# Patient Record
Sex: MALE | Race: WHITE | NOT HISPANIC OR LATINO | Employment: UNEMPLOYED | URBAN - METROPOLITAN AREA
[De-identification: names, ages, dates, MRNs, and addresses within clinical notes are randomized per-mention and may not be internally consistent; named-entity substitution may affect disease eponyms.]

---

## 2017-05-11 ENCOUNTER — TRANSFERRED RECORDS (OUTPATIENT)
Dept: HEALTH INFORMATION MANAGEMENT | Facility: CLINIC | Age: 57
End: 2017-05-11

## 2017-05-11 LAB — EJECTION FRACTION: 68 %

## 2019-01-24 ENCOUNTER — TRANSFERRED RECORDS (OUTPATIENT)
Dept: HEALTH INFORMATION MANAGEMENT | Facility: CLINIC | Age: 59
End: 2019-01-24

## 2019-01-29 ENCOUNTER — TRANSFERRED RECORDS (OUTPATIENT)
Dept: HEALTH INFORMATION MANAGEMENT | Facility: CLINIC | Age: 59
End: 2019-01-29

## 2019-02-01 ENCOUNTER — TRANSFERRED RECORDS (OUTPATIENT)
Dept: HEALTH INFORMATION MANAGEMENT | Facility: CLINIC | Age: 59
End: 2019-02-01

## 2023-02-17 ENCOUNTER — OFFICE VISIT (OUTPATIENT)
Dept: FAMILY MEDICINE | Facility: CLINIC | Age: 63
End: 2023-02-17
Payer: COMMERCIAL

## 2023-02-17 VITALS
HEIGHT: 60 IN | OXYGEN SATURATION: 98 % | HEART RATE: 73 BPM | BODY MASS INDEX: 26.5 KG/M2 | TEMPERATURE: 97.1 F | WEIGHT: 135 LBS | RESPIRATION RATE: 16 BRPM | SYSTOLIC BLOOD PRESSURE: 167 MMHG | DIASTOLIC BLOOD PRESSURE: 97 MMHG

## 2023-02-17 DIAGNOSIS — Z72.0 NICOTINE USE: ICD-10-CM

## 2023-02-17 DIAGNOSIS — K21.00 GASTROESOPHAGEAL REFLUX DISEASE WITH ESOPHAGITIS, UNSPECIFIED WHETHER HEMORRHAGE: ICD-10-CM

## 2023-02-17 DIAGNOSIS — Z23 NEED FOR COVID-19 VACCINE: ICD-10-CM

## 2023-02-17 DIAGNOSIS — E11.9 TYPE 2 DIABETES MELLITUS WITHOUT COMPLICATION, WITHOUT LONG-TERM CURRENT USE OF INSULIN (H): ICD-10-CM

## 2023-02-17 DIAGNOSIS — Z11.4 ENCOUNTER FOR SCREENING FOR HIV: ICD-10-CM

## 2023-02-17 DIAGNOSIS — E78.5 HYPERLIPIDEMIA, UNSPECIFIED HYPERLIPIDEMIA TYPE: ICD-10-CM

## 2023-02-17 DIAGNOSIS — R03.0 ELEVATED BP WITHOUT DIAGNOSIS OF HYPERTENSION: ICD-10-CM

## 2023-02-17 DIAGNOSIS — Z11.59 NEED FOR HEPATITIS C SCREENING TEST: ICD-10-CM

## 2023-02-17 DIAGNOSIS — Z76.89 ESTABLISHING CARE WITH NEW DOCTOR, ENCOUNTER FOR: Primary | ICD-10-CM

## 2023-02-17 LAB
ALBUMIN SERPL BCG-MCNC: 4.6 G/DL (ref 3.5–5.2)
ALP SERPL-CCNC: 101 U/L (ref 40–129)
ALT SERPL W P-5'-P-CCNC: 149 U/L (ref 10–50)
ANION GAP SERPL CALCULATED.3IONS-SCNC: 14 MMOL/L (ref 7–15)
AST SERPL W P-5'-P-CCNC: 102 U/L (ref 10–50)
BILIRUB SERPL-MCNC: 0.4 MG/DL
BUN SERPL-MCNC: 10.8 MG/DL (ref 8–23)
CALCIUM SERPL-MCNC: 9.5 MG/DL (ref 8.8–10.2)
CHLORIDE SERPL-SCNC: 104 MMOL/L (ref 98–107)
CHOLEST SERPL-MCNC: 317 MG/DL
CREAT SERPL-MCNC: 1.05 MG/DL (ref 0.67–1.17)
DEPRECATED HCO3 PLAS-SCNC: 25 MMOL/L (ref 22–29)
ERYTHROCYTE [DISTWIDTH] IN BLOOD BY AUTOMATED COUNT: 13.6 % (ref 10–15)
GFR SERPL CREATININE-BSD FRML MDRD: 80 ML/MIN/1.73M2
GLUCOSE SERPL-MCNC: 181 MG/DL (ref 70–99)
HBA1C MFR BLD: 7.3 % (ref 0–5.6)
HCT VFR BLD AUTO: 44.6 % (ref 40–53)
HDLC SERPL-MCNC: 29 MG/DL
HGB BLD-MCNC: 14.8 G/DL (ref 13.3–17.7)
LDLC SERPL CALC-MCNC: ABNORMAL MG/DL
MCH RBC QN AUTO: 26.8 PG (ref 26.5–33)
MCHC RBC AUTO-ENTMCNC: 33.2 G/DL (ref 31.5–36.5)
MCV RBC AUTO: 81 FL (ref 78–100)
NONHDLC SERPL-MCNC: 288 MG/DL
PLATELET # BLD AUTO: 191 10E3/UL (ref 150–450)
POTASSIUM SERPL-SCNC: 3.4 MMOL/L (ref 3.4–5.3)
PROT SERPL-MCNC: 8 G/DL (ref 6.4–8.3)
RBC # BLD AUTO: 5.52 10E6/UL (ref 4.4–5.9)
SODIUM SERPL-SCNC: 143 MMOL/L (ref 136–145)
TRIGL SERPL-MCNC: 499 MG/DL
WBC # BLD AUTO: 6.4 10E3/UL (ref 4–11)

## 2023-02-17 PROCEDURE — 0124A COVID-19 VACCINE BIVALENT BOOSTER 12+ (PFIZER): CPT | Performed by: STUDENT IN AN ORGANIZED HEALTH CARE EDUCATION/TRAINING PROGRAM

## 2023-02-17 PROCEDURE — 80061 LIPID PANEL: CPT | Performed by: STUDENT IN AN ORGANIZED HEALTH CARE EDUCATION/TRAINING PROGRAM

## 2023-02-17 PROCEDURE — 83036 HEMOGLOBIN GLYCOSYLATED A1C: CPT | Performed by: STUDENT IN AN ORGANIZED HEALTH CARE EDUCATION/TRAINING PROGRAM

## 2023-02-17 PROCEDURE — 86803 HEPATITIS C AB TEST: CPT | Performed by: STUDENT IN AN ORGANIZED HEALTH CARE EDUCATION/TRAINING PROGRAM

## 2023-02-17 PROCEDURE — 87389 HIV-1 AG W/HIV-1&-2 AB AG IA: CPT | Performed by: STUDENT IN AN ORGANIZED HEALTH CARE EDUCATION/TRAINING PROGRAM

## 2023-02-17 PROCEDURE — 36415 COLL VENOUS BLD VENIPUNCTURE: CPT | Performed by: STUDENT IN AN ORGANIZED HEALTH CARE EDUCATION/TRAINING PROGRAM

## 2023-02-17 PROCEDURE — 80053 COMPREHEN METABOLIC PANEL: CPT | Performed by: STUDENT IN AN ORGANIZED HEALTH CARE EDUCATION/TRAINING PROGRAM

## 2023-02-17 PROCEDURE — 85027 COMPLETE CBC AUTOMATED: CPT | Performed by: STUDENT IN AN ORGANIZED HEALTH CARE EDUCATION/TRAINING PROGRAM

## 2023-02-17 PROCEDURE — 99203 OFFICE O/P NEW LOW 30 MIN: CPT | Performed by: STUDENT IN AN ORGANIZED HEALTH CARE EDUCATION/TRAINING PROGRAM

## 2023-02-17 PROCEDURE — 91312 COVID-19 VACCINE BIVALENT BOOSTER 12+ (PFIZER): CPT | Performed by: STUDENT IN AN ORGANIZED HEALTH CARE EDUCATION/TRAINING PROGRAM

## 2023-02-17 RX ORDER — OMEPRAZOLE 40 MG/1
40 CAPSULE, DELAYED RELEASE ORAL DAILY
Qty: 90 CAPSULE | Refills: 3 | Status: SHIPPED | OUTPATIENT
Start: 2023-02-17

## 2023-02-17 NOTE — PROGRESS NOTES
Assessment & Plan     Establishing care with new doctor, encounter for  Patient recently moved here from [place].  Establishing care.    Hyperlipidemia, unspecified hyperlipidemia type  Chronic, not on any medications per patient.  We will check cholesterol levels today.  - Lipid Profile  - Lipid Profile    Type 2 diabetes mellitus without complication, without long-term current use of insulin (H)  Chronic, unclear if it is controlled or not.  Patient reports that he is on metformin 500 mg daily.  We will check A1c today.  Recommended that patient increase metformin to 500 mg twice daily.  Patient is agreeable.  - Comprehensive metabolic panel  - Hemoglobin A1c  - metFORMIN (GLUCOPHAGE) 500 MG tablet  Dispense: 180 tablet; Refill: 3  - Comprehensive metabolic panel  - Hemoglobin A1c    Gastroesophageal reflux disease with esophagitis, unspecified whether hemorrhage  Patient reports a history of gastric ulcers, reports that an EGD was done before.  Will refill omeprazole for the same dose 40 mg daily.  -We will have patient complete CHLOÉ for medical records from previous clinic.  Patient reports he does not member the name of the clinic, however will return the CHLOÉ form as soon as he is able.  - CBC with platelets  - omeprazole (PRILOSEC) 40 MG DR capsule  Dispense: 90 capsule; Refill: 3  - CBC with platelets    Need for hepatitis C screening test  - Hepatitis C Screen Reflex to HCV RNA Quant and Genotype  - Hepatitis C Screen Reflex to HCV RNA Quant and Genotype    Encounter for screening for HIV  - HIV Antigen Antibody Combo  - HIV Antigen Antibody Combo    Elevated BP without diagnosis of hypertension  Blood pressure elevated today, 167/97.  Asymptomatic.  Denies a history of hypertension.  Not on medications.  - RTC in 1 month for blood pressure recheck, if still elevated, will consider starting antihypertensive    Nicotine use  Smokes pipes 2-3x per day. Still smokes. Started at 15yo.     Need for COVID-19  "vaccine  - COVID-19 VACCINE BIVALENT BOOSTER 12+ (PFIZER)      Review of external notes as documented elsewhere in note       BMI:   Estimated body mass index is 28.34 kg/m  as calculated from the following:    Height as of this encounter: 1.47 m (4' 9.87\").    Weight as of this encounter: 61.2 kg (135 lb).   Weight management plan: Discussed healthy diet and exercise guidelines        Return in about 4 weeks (around 3/17/2023) for Follow up BP .    Sheila Pérez MD  [facility]    Chemo Rhodes is a 62 year old accompanied by his self, presenting for the following health issues:  office visit (Pt state he's been on metformin and omeprazole for two years now. He would like to check today if he still need to take it or not. Stop taking them since feb, 2022. Ulcers and cholesterol.)      History of Present Illness       Reason for visit:  Est care, new pt    He eats 2-3 servings of fruits and vegetables daily.He consumes 0 sweetened beverage(s) daily.He exercises with enough effort to increase his heart rate 9 or less minutes per day.  He exercises with enough effort to increase his heart rate 3 or less days per week.   He is taking medications regularly.       PMH: DM2 HLD, stomach disease or problem? Has not been seen for the last 2 years. Does not remember previous clinic in [place], will take CHLOÉ home and find clinic name. Reports that he was previously treated for latent TB.     PSH: No surgeries.     Social: recently moved her from [place]. Does not drink alcohol. Smokes pipes 2-3x per day. Still smokes. Started at 15yo.       DM2  - on metformin 500mg daily    Ulcers? GERD?  - prescribed omeprazole 40mg daily  - Reports that they did do an EGD. Was told that he day ulcers.   - Denies hematochezia or hematemesis. Stopped after taking this meds.   - Reports that he also had colonoscopy done - nothing was seen.     Review of Systems   Constitutional: Negative for fever and chills.   Respiratory: " "Negative for cough or shortness of breath.    Cardiovascular: Negative for chest pain or chest pressure.   Gastrointestinal: Negative for nausea, vomiting, diarrhea or abdominal pain.'      Objective    BP (!) 167/97 (BP Location: Left arm, Patient Position: Sitting, Cuff Size: Adult Regular)   Pulse 73   Temp 97.1  F (36.2  C) (Temporal)   Resp 16   Ht 1.47 m (4' 9.87\")   Wt 61.2 kg (135 lb)   SpO2 98%   BMI 28.34 kg/m    Body mass index is 28.34 kg/m .  Physical Exam   PHYSICAL EXAM  General: Well developed, well nourished.  Skin:  Dry without rash.    Head:  Normocephalic-atraumatic.    Eye:  Normal conjunctivae.     Respiratory:  Normal respiratory effort.   Gastrointestinal:  Non-distended.    Musculoskeletal:  No deformity or edema.  Neurologic: No focal deficits.              "

## 2023-02-20 PROBLEM — R74.8 ELEVATED LIVER ENZYMES: Status: ACTIVE | Noted: 2023-02-20

## 2023-02-20 PROBLEM — E78.2 MIXED HYPERLIPIDEMIA: Status: ACTIVE | Noted: 2023-02-17

## 2023-02-20 LAB
HCV AB SERPL QL IA: NONREACTIVE
HIV 1+2 AB+HIV1 P24 AG SERPL QL IA: NONREACTIVE

## 2023-02-21 ENCOUNTER — TELEPHONE (OUTPATIENT)
Dept: FAMILY MEDICINE | Facility: CLINIC | Age: 63
End: 2023-02-21
Payer: COMMERCIAL

## 2023-02-21 NOTE — LETTER
February 23, 2023      Carmelo Fuller  [address] [place] [address]        Dear Mr. Fuller,    We have tried to reach you by phone but your phone number is invalid. We are writing to inform you of your test results:    Screening for HIV and hepatitis C were negative.  Blood counts were normal.     Electrolytes and kidney function were normal.  Liver function test shows some elevated liver enzymes/liver inflammation.  We can recheck at your next visit.  I recommend that you fast for this.  If you would like me to order this lab for you to come in in another day fasting, and I can do that as well.     Cholesterol levels are very high.  I recommend that you start taking a daily medication for lowering your cholesterol and a baby aspirin daily to help lower yourrisk of having heart attack or stroke.  If you are okay with that, I can send prescriptions to your pharmacy.     Your diabetes is well controlled at this time.  We will continue the same medications as discussed at our recent visit.    Test results indicate you may require additional follow up. Please call us at [phone] to discuss cholesterol medication.     Resulted Orders   Lipid Profile   Result Value Ref Range    Cholesterol 317 (H) <200 mg/dL    Triglycerides 499 (H) <150 mg/dL    Direct Measure HDL 29 (L) >=40 mg/dL    LDL Cholesterol Calculated        Comment:      Cannot estimate LDL when triglyceride exceeds 400 mg/dL    Non HDL Cholesterol 288 (H) <130 mg/dL    Narrative    Cholesterol  Desirable:  <200 mg/dL    Triglycerides  Normal:  Less than 150 mg/dL  Borderline High:  150-199 mg/dL  High:  200-499 mg/dL  Very High:  Greater than or equal to 500 mg/dL    Direct Measure HDL  Female:  Greater than or equal to 50 mg/dL   Male:  Greater than or equal to 40 mg/dL    LDL Cholesterol  Desirable:  <100mg/dL  Above Desirable:  100-129 mg/dL   Borderline High:  130-159 mg/dL   High:  160-189 mg/dL   Very High:  >= 190 mg/dL    Non HDL  Cholesterol  Desirable:  130 mg/dL  Above Desirable:  130-159 mg/dL  Borderline High:  160-189 mg/dL  High:  190-219 mg/dL  Very High:  Greater than or equal to 220 mg/dL   Comprehensive metabolic panel   Result Value Ref Range    Sodium 143 136 - 145 mmol/L    Potassium 3.4 3.4 - 5.3 mmol/L    Chloride 104 98 - 107 mmol/L    Carbon Dioxide (CO2) 25 22 - 29 mmol/L    Anion Gap 14 7 - 15 mmol/L    Urea Nitrogen 10.8 8.0 - 23.0 mg/dL    Creatinine 1.05 0.67 - 1.17 mg/dL    Calcium 9.5 8.8 - 10.2 mg/dL    Glucose 181 (H) 70 - 99 mg/dL    Alkaline Phosphatase 101 40 - 129 U/L     (H) 10 - 50 U/L     (H) 10 - 50 U/L    Protein Total 8.0 6.4 - 8.3 g/dL    Albumin 4.6 3.5 - 5.2 g/dL    Bilirubin Total 0.4 <=1.2 mg/dL    GFR Estimate 80 >60 mL/min/1.73m2      Comment:      eGFR calculated using 2021 CKD-EPI equation.   Hemoglobin A1c   Result Value Ref Range    Hemoglobin A1C 7.3 (H) 0.0 - 5.6 %      Comment:      Normal <5.7%   Prediabetes 5.7-6.4%    Diabetes 6.5% or higher     Note: Adopted from ADA consensus guidelines.   CBC with platelets   Result Value Ref Range    WBC Count 6.4 4.0 - 11.0 10e3/uL    RBC Count 5.52 4.40 - 5.90 10e6/uL    Hemoglobin 14.8 13.3 - 17.7 g/dL    Hematocrit 44.6 40.0 - 53.0 %    MCV 81 78 - 100 fL    MCH 26.8 26.5 - 33.0 pg    MCHC 33.2 31.5 - 36.5 g/dL    RDW 13.6 10.0 - 15.0 %    Platelet Count 191 150 - 450 10e3/uL   HIV Antigen Antibody Combo   Result Value Ref Range    HIV Antigen Antibody Combo Nonreactive Nonreactive      Comment:      HIV-1 p24 Ag & HIV-1/HIV-2 Ab Not Detected   Hepatitis C Screen Reflex to HCV RNA Quant and Genotype   Result Value Ref Range    Hepatitis C Antibody Nonreactive Nonreactive    Narrative    Assay performance characteristics have not been established for newborns, infants, and children.       If you have any questions or concerns, please call the clinic at the number listed above.       Sincerely,      Sheila Pérez MD / [facility] RN

## 2023-02-21 NOTE — TELEPHONE ENCOUNTER
"Attempted to call pt and relay lab result. The phone number stated \"this number you are calling has been changed or disconnected\" no other phone number on file and no C2C on file.    Franki Coto Cem Say, ALIREZA MADRID  [facility]        ----- Message from Sheila Pérez MD sent at 2/20/2023  1:58 PM CST -----  Screening for HIV and hepatitis C were negative.  Blood counts were normal.    Electrolytes and kidney function were normal.  Liver function test shows some elevated liver enzymes/liver inflammation.  We can recheck at his next visit.  I recommend that he fast for this.  If he would like me to order this lab for him to come in in another day fasting, and I can do that as well.    Cholesterol levels are very high.  I recommend that he start taking a daily medication for lowering his cholesterol and a baby aspirin daily to help lower his risk of having heart attack or stroke.  If he is okay with that, I can send prescriptions to his pharmacy.    His diabetes is well controlled at this time.  We will continue the same medications as discussed at his visit.    "

## 2023-02-22 NOTE — TELEPHONE ENCOUNTER
RN made 2nd attempt to call patient via [facility]  to relay the lab results below.      Patient's phone number did not work. Attempted to call patient's friend ( no c2c on file) and left non-detailed message to call the clinic back.       If patient calls back, please relay Dr. Pérez's message below.        Yue Medina RN  [facility]

## 2023-02-23 NOTE — TELEPHONE ENCOUNTER
RN made 3rd attempt to call patient regarding lab results below. Patient's number invalid and friend did not answer.     Per protocol, RN will send letter to home address on file.     Lynnette Tracy RN BSN  [facility]

## 2023-03-20 ENCOUNTER — OFFICE VISIT (OUTPATIENT)
Dept: FAMILY MEDICINE | Facility: CLINIC | Age: 63
End: 2023-03-20
Payer: COMMERCIAL

## 2023-03-20 VITALS
DIASTOLIC BLOOD PRESSURE: 96 MMHG | TEMPERATURE: 96.9 F | HEART RATE: 64 BPM | OXYGEN SATURATION: 96 % | WEIGHT: 127.5 LBS | BODY MASS INDEX: 25.03 KG/M2 | SYSTOLIC BLOOD PRESSURE: 157 MMHG | RESPIRATION RATE: 16 BRPM | HEIGHT: 60 IN

## 2023-03-20 DIAGNOSIS — E78.2 MIXED HYPERLIPIDEMIA: ICD-10-CM

## 2023-03-20 DIAGNOSIS — I10 PRIMARY HYPERTENSION: Primary | ICD-10-CM

## 2023-03-20 DIAGNOSIS — R21 RASH: ICD-10-CM

## 2023-03-20 PROCEDURE — 99214 OFFICE O/P EST MOD 30 MIN: CPT | Performed by: STUDENT IN AN ORGANIZED HEALTH CARE EDUCATION/TRAINING PROGRAM

## 2023-03-20 RX ORDER — KETOCONAZOLE 20 MG/G
CREAM TOPICAL DAILY
Qty: 15 G | Refills: 1 | Status: SHIPPED | OUTPATIENT
Start: 2023-03-20 | End: 2023-07-03

## 2023-03-20 RX ORDER — LISINOPRIL 10 MG/1
10 TABLET ORAL DAILY
Qty: 90 TABLET | Refills: 0 | Status: SHIPPED | OUTPATIENT
Start: 2023-03-20 | End: 2023-07-03

## 2023-03-20 NOTE — PROGRESS NOTES
Hyperlipidemia, unspecified hyperlipidemia type  Chronic, not on any medications per patient.  We will check cholesterol levels today.  - Lipid Profile  - Lipid Profile     Type 2 diabetes mellitus without complication, without long-term current use of insulin (H)  Chronic, unclear if it is controlled or not.  Patient reports that he is on metformin 500 mg daily.  We will check A1c today.  Recommended that patient increase metformin to 500 mg twice daily.  Patient is agreeable.  - Comprehensive metabolic panel  - Hemoglobin A1c  - metFORMIN (GLUCOPHAGE) 500 MG tablet  Dispense: 180 tablet; Refill: 3  - Comprehensive metabolic panel  - Hemoglobin A1c     Gastroesophageal reflux disease with esophagitis, unspecified whether hemorrhage  Patient reports a history of gastric ulcers, reports that an EGD was done before.  Will refill omeprazole for the same dose 40 mg daily.  -We will have patient complete CHLOÉ for medical records from previous clinic.  Patient reports he does not member the name of the clinic, however will return the CHLOÉ form as soon as he is able.  - CBC with platelets  - omeprazole (PRILOSEC) 40 MG DR capsule  Dispense: 90 capsule; Refill: 3  - CBC with platelets     Need for hepatitis C screening test  - Hepatitis C Screen Reflex to HCV RNA Quant and Genotype  - Hepatitis C Screen Reflex to HCV RNA Quant and Genotype     Encounter for screening for HIV  - HIV Antigen Antibody Combo  - HIV Antigen Antibody Combo     Elevated BP without diagnosis of hypertension  Blood pressure elevated today, 167/97.  Asymptomatic.  Denies a history of hypertension.  Not on medications.  - RTC in 1 month for blood pressure recheck, if still elevated, will consider starting antihypertensive     Nicotine use  Smokes pipes 2-3x per day. Still smokes. Started at 13yo.     Lab Results   Component Value Date    HGB 14.8 02/17/2023    WBC 6.4 02/17/2023    MCV 81 02/17/2023    CR 1.05 02/17/2023     (H) 02/17/2023    ALT  149 (H) 02/17/2023    GFRESTIMATED 80 02/17/2023    HCVAB Nonreactive 02/17/2023    HIAGAB Nonreactive 02/17/2023        Liver function test shows some elevated liver enzymes/liver inflammation.  We can recheck at his next visit.  I recommend that he fast for this.  If he would like me to order this lab for him to come in in another day fasting, and I can do that as well.     Cholesterol levels are very high.  I recommend that he start taking a daily medication for lowering his cholesterol and a baby aspirin daily to help lower his risk of having heart attack or stroke.  If he is okay with that, I can send prescriptions to his pharmacy.     His diabetes is well controlled at this time.  We will continue the same medications as discussed at his visit.

## 2023-03-20 NOTE — PROGRESS NOTES
Assessment & Plan     Primary hypertension  Blood pressure high again today.  We will start patient on lisinopril 10 mg daily.  - lisinopril (ZESTRIL) 10 MG tablet  Dispense: 90 tablet; Refill: 0  -RTC in 1 month for blood pressure recheck    Mixed hyperlipidemia  Discussed starting aspirin for primary prevention today.  Patient is agreeable.  We will start baby aspirin.  - aspirin (ASA) 81 MG EC tablet  Dispense: 90 tablet; Refill: 3  -Discuss statin at next visit    Rash  Patient reports chronic generalized pruritic rash all over the body including bilateral upper and lower extremities and trunk.  Patient reports that he has used ketoconazole with mild improvement, requested refill today. no obvious rash on exam today.  - ketoconazole (NIZORAL) 2 % external cream  Dispense: 15 g; Refill: 1  -If not improved, consider steroid cream        Return in about 4 weeks (around 4/17/2023) for Routine preventive, BP.    Sheila Pérez MD  [facility]    Chemo Rhodes is a 62 year old accompanied by his self, presenting for the following health issues:  office visit (Follow up on bp, he would come back for physical check. Needed baby aspirin and cream for itchy skin.)      History of Present Illness       Hypertension: He presents for follow up of hypertension.  He does not check blood pressure  regularly outside of the clinic. Outpatient blood pressures have not been over 140/90. He follows a low salt diet.     He eats 2-3 servings of fruits and vegetables daily.He consumes 0 sweetened beverage(s) daily.He exercises with enough effort to increase his heart rate 9 or less minutes per day.  He exercises with enough effort to increase his heart rate 3 or less days per week.   He is taking medications regularly.      Reports generalized rash. Chronic. All over body. No specific visible rash, but feels itchy. Has been using ketoconazole cream with mild relief. Would like a refill.         Review of  "Systems   Constitutional: Negative for fever and chills.   Respiratory: Negative for cough or shortness of breath.    Cardiovascular: Negative for chest pain or chest pressure.   Gastrointestinal: Negative for nausea, vomiting, diarrhea or abdominal pain.        Objective    BP (!) 157/96 (BP Location: Left arm, Patient Position: Sitting, Cuff Size: Adult Regular)   Pulse 64   Temp 96.9  F (36.1  C) (Temporal)   Resp 16   Ht 1.47 m (4' 9.87\")   Wt 57.8 kg (127 lb 8 oz)   SpO2 96%   BMI 26.76 kg/m    Body mass index is 26.76 kg/m .  Physical Exam   PHYSICAL EXAM  General: Well developed, well nourished.  Skin:  Dry without rash.    Head:  Normocephalic-atraumatic.    Eye:  Normal conjunctivae.     Respiratory:  Normal respiratory effort.   Gastrointestinal:  Non-distended.    Musculoskeletal:  No deformity or edema.  Neurologic: No focal deficits.          "

## 2023-05-16 ENCOUNTER — TELEPHONE (OUTPATIENT)
Dept: FAMILY MEDICINE | Facility: CLINIC | Age: 63
End: 2023-05-16

## 2023-05-16 NOTE — TELEPHONE ENCOUNTER
Staff message    ----- Message from Sheila Pérez MD sent at 5/16/2023  2:44 PM CDT -----  Regarding: FW: Colonoscopy  Please call patient to inform him that he is due for blood pressure recheck, as well as a colonoscopy and a preventative visit.  Please offer an appointment for preventative visit to address all of the above.    ----- Message -----  From: Bhumika Elmore RN  Sent: 4/26/2023   2:52 PM CDT  To: Sheila Pérez MD  Subject: Colonoscopy                                      Hi Dr. Pérez,  I am part of the colorectal cancer screening team. We review all colonoscopies and then update or ensure health maintenance is accurate and place a standing order if they meet our high risk criteria. This patient had a tubulovillous polyp back in 2019 per the path report we received, per current guidelines he would be due for repeat screening in 3 years. Hence he is now overdue. Since he is a new patient to the system and was just seen by you I did not feel I should place the order. Please let me know if you would like your team to reach out to him to discuss this.  Thank you,  Nicky Elmore, RN BSN  RN Colorectal Cancer   Division of Gastroenterology  [facility]

## 2023-05-18 NOTE — TELEPHONE ENCOUNTER
Unable to lvm due to ph# on file is not an active ph#. Letter will be sent out to pt and will no longer try to reach pt. Completing task.

## 2023-07-03 ENCOUNTER — OFFICE VISIT (OUTPATIENT)
Dept: FAMILY MEDICINE | Facility: CLINIC | Age: 63
End: 2023-07-03
Payer: COMMERCIAL

## 2023-07-03 VITALS
BODY MASS INDEX: 26.5 KG/M2 | DIASTOLIC BLOOD PRESSURE: 90 MMHG | HEART RATE: 63 BPM | SYSTOLIC BLOOD PRESSURE: 160 MMHG | OXYGEN SATURATION: 98 % | RESPIRATION RATE: 20 BRPM | HEIGHT: 60 IN | TEMPERATURE: 96.9 F | WEIGHT: 135 LBS

## 2023-07-03 DIAGNOSIS — Z72.0 NICOTINE USE: ICD-10-CM

## 2023-07-03 DIAGNOSIS — Z00.00 ROUTINE GENERAL MEDICAL EXAMINATION AT A HEALTH CARE FACILITY: Primary | ICD-10-CM

## 2023-07-03 DIAGNOSIS — D12.6 TUBULOVILLOUS ADENOMA POLYP OF COLON: ICD-10-CM

## 2023-07-03 DIAGNOSIS — Z23 NEED FOR SHINGLES VACCINE: ICD-10-CM

## 2023-07-03 DIAGNOSIS — Z23 NEED FOR PROPHYLACTIC VACCINATION AGAINST STREPTOCOCCUS PNEUMONIAE (PNEUMOCOCCUS): ICD-10-CM

## 2023-07-03 DIAGNOSIS — E11.9 TYPE 2 DIABETES MELLITUS WITHOUT COMPLICATION, WITHOUT LONG-TERM CURRENT USE OF INSULIN (H): ICD-10-CM

## 2023-07-03 DIAGNOSIS — I10 PRIMARY HYPERTENSION: ICD-10-CM

## 2023-07-03 DIAGNOSIS — R80.9 URINE TEST POSITIVE FOR MICROALBUMINURIA: ICD-10-CM

## 2023-07-03 DIAGNOSIS — E78.2 MIXED HYPERLIPIDEMIA: ICD-10-CM

## 2023-07-03 DIAGNOSIS — R74.8 ELEVATED LIVER ENZYMES: ICD-10-CM

## 2023-07-03 PROBLEM — R03.0 ELEVATED BP WITHOUT DIAGNOSIS OF HYPERTENSION: Status: RESOLVED | Noted: 2023-02-17 | Resolved: 2023-07-03

## 2023-07-03 LAB
CREAT UR-MCNC: 91.1 MG/DL
MICROALBUMIN UR-MCNC: 31.8 MG/L
MICROALBUMIN/CREAT UR: 34.91 MG/G CR (ref 0–17)

## 2023-07-03 PROCEDURE — 82043 UR ALBUMIN QUANTITATIVE: CPT | Performed by: STUDENT IN AN ORGANIZED HEALTH CARE EDUCATION/TRAINING PROGRAM

## 2023-07-03 PROCEDURE — 90471 IMMUNIZATION ADMIN: CPT | Performed by: STUDENT IN AN ORGANIZED HEALTH CARE EDUCATION/TRAINING PROGRAM

## 2023-07-03 PROCEDURE — 99214 OFFICE O/P EST MOD 30 MIN: CPT | Mod: 25 | Performed by: STUDENT IN AN ORGANIZED HEALTH CARE EDUCATION/TRAINING PROGRAM

## 2023-07-03 PROCEDURE — 90677 PCV20 VACCINE IM: CPT | Performed by: STUDENT IN AN ORGANIZED HEALTH CARE EDUCATION/TRAINING PROGRAM

## 2023-07-03 PROCEDURE — 99396 PREV VISIT EST AGE 40-64: CPT | Mod: 25 | Performed by: STUDENT IN AN ORGANIZED HEALTH CARE EDUCATION/TRAINING PROGRAM

## 2023-07-03 PROCEDURE — 82570 ASSAY OF URINE CREATININE: CPT | Performed by: STUDENT IN AN ORGANIZED HEALTH CARE EDUCATION/TRAINING PROGRAM

## 2023-07-03 RX ORDER — LISINOPRIL 10 MG/1
10 TABLET ORAL DAILY
Qty: 90 TABLET | Refills: 3 | Status: SHIPPED | OUTPATIENT
Start: 2023-07-03 | End: 2024-05-13

## 2023-07-03 RX ORDER — ATORVASTATIN CALCIUM 40 MG/1
40 TABLET, FILM COATED ORAL DAILY
Qty: 90 TABLET | Refills: 3 | Status: SHIPPED | OUTPATIENT
Start: 2023-07-03 | End: 2024-05-13

## 2023-07-03 ASSESSMENT — ENCOUNTER SYMPTOMS
EYE PAIN: 0
ABDOMINAL PAIN: 1
NERVOUS/ANXIOUS: 0
FREQUENCY: 1
DIZZINESS: 0
HEMATURIA: 0
HEMATOCHEZIA: 1
SHORTNESS OF BREATH: 0
WEAKNESS: 1
DYSURIA: 0
PALPITATIONS: 0
CHILLS: 0
PARESTHESIAS: 0
HEADACHES: 0
FEVER: 0
SORE THROAT: 0
DIARRHEA: 0
JOINT SWELLING: 0
NAUSEA: 0
ARTHRALGIAS: 1
HEARTBURN: 0
COUGH: 1
CONSTIPATION: 1
MYALGIAS: 0

## 2023-07-03 NOTE — PROGRESS NOTES
Lab Results   Component Value Date    HGB 14.8 02/17/2023    WBC 6.4 02/17/2023    MCV 81 02/17/2023    CR 1.05 02/17/2023     (H) 02/17/2023     (H) 02/17/2023    GFRESTIMATED 80 02/17/2023    HCVAB Nonreactive 02/17/2023    HIAGAB Nonreactive 02/17/2023     Labs reviewed:  Electrolytes and kidney function were normal.  Liver function test shows some elevated liver enzymes/liver inflammation.  We can recheck at his next visit.  I recommend that he fast for this.  If he would like me to order this lab for him to come in in another day fasting, and I can do that as well.  Cholesterol levels are very high.  I recommend that he start taking a daily medication for lowering his cholesterol and a baby aspirin daily to help lower his risk of having heart attack or stroke.  If he is okay with that, I can send prescriptions to his pharmacy.  His diabetes is well controlled at this time.  We will continue the same medications as discussed at his visit.      HTN  - BP higher today, reports that he forgot to take it for 2 weeks.   - Denies SOB, CP, vision changes, HA, or palpitations.    Phone #: [phone]

## 2023-07-03 NOTE — PROGRESS NOTES
SUBJECTIVE:   CC: Carmelo is an 63 year old who presents for preventative health visit.       7/3/2023    10:19 AM   Additional Questions   Roomed by gilles     Healthy Habits:     Getting at least 3 servings of Calcium per day:  Yes    Bi-annual eye exam:  NO    Dental care twice a year:  NO    Sleep apnea or symptoms of sleep apnea:  Daytime drowsiness    Diet:  Regular (no restrictions)    Frequency of exercise:  None    Taking medications regularly:  No    Barriers to taking medications:  Problems remembering to take them    Medication side effects:  None    Additional concerns today:  No      Today's PHQ-2 Score:       7/3/2023    10:23 AM   PHQ-2 ( 1999 [employer])   Q1: Little interest or pleasure in doing things 0   Q2: Feeling down, depressed or hopeless 0   PHQ-2 Score 0         Lab Results   Component Value Date    HGB 14.8 02/17/2023    WBC 6.4 02/17/2023    MCV 81 02/17/2023    CR 1.05 02/17/2023     (H) 02/17/2023     (H) 02/17/2023    GFRESTIMATED 80 02/17/2023    HCVAB Nonreactive 02/17/2023    HIAGAB Nonreactive 02/17/2023     Labs reviewed:  Electrolytes and kidney function were normal.  Liver function test shows some elevated liver enzymes/liver inflammation.  We can recheck at his next visit.  I recommend that he fast for this.  If he would like me to order this lab for him to come in in another day fasting, and I can do that as well.  Cholesterol levels are very high.  I recommend that he start taking a daily medication for lowering his cholesterol and a baby aspirin daily to help lower his risk of having heart attack or stroke.  If he is okay with that, I can send prescriptions to his pharmacy.  His diabetes is well controlled at this time.  We will continue the same medications as discussed at his visit.      HTN  - BP higher today, reports that he forgot to take it for 2 weeks.   - Denies SOB, CP, vision changes, HA, or palpitations.    Phone #: [phone]          Social History  "    Tobacco Use     Smoking status: Never     Smokeless tobacco: Never   Substance Use Topics     Alcohol use: Not on file             7/3/2023    10:19 AM   Alcohol Use   Prescreen: >3 drinks/day or >7 drinks/week? No       Last PSA: No results found for: PSA    Reviewed orders with patient. Reviewed health maintenance and updated orders accordingly - Yes  Lab work is in process  Labs reviewed in EPIC    Reviewed and updated as needed this visit by clinical staff    Allergies  Meds              Reviewed and updated as needed this visit by Provider                     Review of Systems   Constitutional: Negative for chills and fever.   HENT: Positive for congestion and hearing loss. Negative for ear pain and sore throat.    Eyes: Negative for pain and visual disturbance.   Respiratory: Positive for cough. Negative for shortness of breath.    Cardiovascular: Negative for chest pain, palpitations and peripheral edema.   Gastrointestinal: Positive for abdominal pain, constipation and hematochezia. Negative for diarrhea, heartburn and nausea.   Genitourinary: Positive for frequency and urgency. Negative for dysuria, genital sores, hematuria, impotence and penile discharge.   Musculoskeletal: Positive for arthralgias. Negative for joint swelling and myalgias.   Skin: Negative for rash.   Neurological: Positive for weakness. Negative for dizziness, headaches and paresthesias.   Psychiatric/Behavioral: Negative for mood changes. The patient is not nervous/anxious.          OBJECTIVE:   BP (!) 160/90   Pulse 63   Temp 96.9  F (36.1  C) (Temporal)   Resp 20   Ht 1.465 m (4' 9.68\")   Wt 61.2 kg (135 lb)   SpO2 98%   BMI 28.53 kg/m      Physical Exam  General Appearance:  Alert, cooperative, no distress, appears stated age.  Head:  Normocephalic, without obvious abnormality, atraumatic.  Eyes:  Conjunctivae/corneas clear, extraocular movements intact both eyes.  Lungs:  Clear to auscultation bilaterally, respirations " unlabored.  Heart:  Regular rate and rhythm, S1 and S2 normal, no murmur, rub or gallop.  Abdomen:  Soft, non-tender, bowel sounds active all four quadrants.   Extremities:  Atraumatic, no cyanosis or edema.  Skin:  Skin color, texture, turgor normal, no rashes or lesions.  Neurologic: No focal deficits.      ASSESSMENT/PLAN:   Carmelo was seen today for hypertension and physical.    Diagnoses and all orders for this visit:    Routine general medical examination at a health care facility  -     REVIEW OF HEALTH MAINTENANCE PROTOCOL ORDERS    Primary hypertension  Chronic, uncontrolled.  Dramatic.  Patient reports that he has not been compliant with his lisinopril for the past 2 weeks due to forgetting.  Discussed importance of medication compliance.  Patient was agreeable.  -     lisinopril (ZESTRIL) 10 MG tablet; Take 1 tablet (10 mg) by mouth daily  -RTC in 1 month for blood pressure recheck.    Type 2 diabetes mellitus without complication, without long-term current use of insulin (H)  Recently diagnosed at last visit.  Patient has been taking metformin 500 mg twice daily, discussed slow titration up to 1 g twice daily, patient was agreeable.  Recheck ordered, however patient will return to clinic to complete when he does his hepatic panel as well.  -     Hemoglobin A1c; Future  -     Albumin Random Urine Quantitative with Creat Ratio; Future  -     Adult Eye  Referral; Future  -     metFORMIN (GLUCOPHAGE) 500 MG tablet; Take 2 tablets (1,000 mg) by mouth 2 times daily (with meals) Start with 1 tab 2 times a day for 1 week, increase to 2 tabs in the morning and 1 tab at night for 1 week, then increase to 2 tabs 2 times daily.  -     Albumin Random Urine Quantitative with Creat Ratio    Need for prophylactic vaccination against Streptococcus pneumoniae (pneumococcus)  -     PNEUMOCOCCAL 20 VALENT CONJUGATE (PREVNAR 20)    Need for shingles vaccine  Defer    Tubulovillous adenoma polyp of colon  Last  colonoscopy 2019, due for colonoscopy.  Patient was agreeable.  Ordered.  -     Colonoscopy Screening  Referral; Future    Elevated liver enzymes  Recommended the patient come in fasting today for repeat lab, however patient was not fasting.  Patient will make a lab appointment to come in that third day to complete lab fasting.  -     Hepatic panel (Albumin, ALT, AST, Bili, Alk Phos, TP); Future    Mixed hyperlipidemia  The 10-year ASCVD risk score (Leah ORLANDO, et al., 2019) is: 57.6%  Discussed with patient that he should start cholesterol medication.  He was agreeable.  Ordered.  -     atorvastatin (LIPITOR) 40 MG tablet; Take 1 tablet (40 mg) by mouth daily        Patient has been advised of split billing requirements and indicates understanding: Yes      COUNSELING:   Reviewed preventive health counseling, as reflected in patient instructions       Regular exercise       Healthy diet/nutrition       Vision screening       Hearing screening       Advance Care Planning        He reports that he has never smoked. He has never used smokeless tobacco.            Sheila Pérez MD  [facility]

## 2023-07-06 ENCOUNTER — TELEPHONE (OUTPATIENT)
Dept: FAMILY MEDICINE | Facility: CLINIC | Age: 63
End: 2023-07-06
Payer: COMMERCIAL

## 2023-07-06 PROBLEM — R80.9 URINE TEST POSITIVE FOR MICROALBUMINURIA: Status: ACTIVE | Noted: 2023-07-06

## 2023-07-06 NOTE — TELEPHONE ENCOUNTER
Attempted to contact patient, unable to leave message as voice mail box not set up. If he calls back, please relay message below from Dr. Pérez and assist with scheduling lab appointment.       ----- Message from Sheila Pérez MD sent at 7/6/2023  2:07 PM CDT -----  Urine test showed that he did have a small amount of protein in his urine.  We will need to recheck this to confirm.  I have already ordered a lab.  Please help him make a lab appointment to complete this.

## 2023-07-07 NOTE — PROGRESS NOTES
Attempt #1. Unable to lvm. Postponing until tomorrow. If patient calls back please help patient schedule appt listed below. Thanks!

## 2023-07-07 NOTE — TELEPHONE ENCOUNTER
RN made 2nd attempt to contact patient, unable to leave message as voice mail box not set up. If he calls back, please relay message below from Dr. Pérez and assist with scheduling lab appointment.     Samina Vasquez RN  [facility]    ----- Message from Sheila Pérez MD sent at 7/6/2023  2:07 PM CDT -----  Urine test showed that he did have a small amount of protein in his urine.  We will need to recheck this to confirm.  I have already ordered a lab.  Please help him make a lab appointment to complete this.

## 2023-07-10 NOTE — PROGRESS NOTES
Unable to lvm as vm box is not setup. Postponing until tomorrow. If patient calls back please help patient schedule appts listed below. Thanks! 2nd attempt.

## 2023-07-10 NOTE — TELEPHONE ENCOUNTER
Contacted patient and discussed urine results. He verbalized understanding. He has a lab appointment already scheduled this week for lipid panel, will complete urine at that appointment as well. Date & time confirmed.

## 2023-07-14 ENCOUNTER — LAB (OUTPATIENT)
Dept: LAB | Facility: CLINIC | Age: 63
End: 2023-07-14
Payer: COMMERCIAL

## 2023-07-14 DIAGNOSIS — R74.8 ELEVATED LIVER ENZYMES: ICD-10-CM

## 2023-07-14 DIAGNOSIS — E11.9 TYPE 2 DIABETES MELLITUS WITHOUT COMPLICATION, WITHOUT LONG-TERM CURRENT USE OF INSULIN (H): ICD-10-CM

## 2023-07-14 DIAGNOSIS — R80.9 URINE TEST POSITIVE FOR MICROALBUMINURIA: ICD-10-CM

## 2023-07-14 LAB
ALBUMIN SERPL BCG-MCNC: 4.4 G/DL (ref 3.5–5.2)
ALP SERPL-CCNC: 102 U/L (ref 40–129)
ALT SERPL W P-5'-P-CCNC: 113 U/L (ref 0–70)
AST SERPL W P-5'-P-CCNC: 67 U/L (ref 0–45)
BILIRUB DIRECT SERPL-MCNC: <0.2 MG/DL (ref 0–0.3)
BILIRUB SERPL-MCNC: 0.3 MG/DL
CREAT UR-MCNC: 235 MG/DL
HBA1C MFR BLD: 8.3 % (ref 0–5.6)
MICROALBUMIN UR-MCNC: 25.2 MG/L
MICROALBUMIN/CREAT UR: 10.72 MG/G CR (ref 0–17)
PROT SERPL-MCNC: 7.9 G/DL (ref 6.4–8.3)

## 2023-07-14 PROCEDURE — 82570 ASSAY OF URINE CREATININE: CPT

## 2023-07-14 PROCEDURE — 82728 ASSAY OF FERRITIN: CPT

## 2023-07-14 PROCEDURE — 80076 HEPATIC FUNCTION PANEL: CPT

## 2023-07-14 PROCEDURE — 36415 COLL VENOUS BLD VENIPUNCTURE: CPT

## 2023-07-14 PROCEDURE — 83036 HEMOGLOBIN GLYCOSYLATED A1C: CPT

## 2023-07-14 PROCEDURE — 82043 UR ALBUMIN QUANTITATIVE: CPT

## 2023-07-18 ENCOUNTER — TELEPHONE (OUTPATIENT)
Dept: FAMILY MEDICINE | Facility: CLINIC | Age: 63
End: 2023-07-18
Payer: COMMERCIAL

## 2023-07-18 PROBLEM — R93.2 ABNORMAL LIVER ULTRASOUND: Status: ACTIVE | Noted: 2023-07-18

## 2023-07-18 LAB — FERRITIN SERPL-MCNC: 191 NG/ML (ref 31–409)

## 2023-07-18 NOTE — LETTER
July 20, 2023      Carmelo Fuller  [address]   [place] [address]        Dear ,    We are writing to inform you of your test results.    Your liver enzymes are still elevated.  If you are okay with it I am going to test for hepatitis B and abnormal iron storage.  We should be able to use the blood that we good the other day.     Diabetes control has worsened.  A1c increased from 7.3 to 8.3.  I recommend Lifestyle modifications, including: Increasing intake of vegetables and fruits, decreasing intake of processed foods/carbohydrates/sugars, having regular physical activity.     Screening for kidney damage was normal.    Resulted Orders   Hemoglobin A1c   Result Value Ref Range    Hemoglobin A1C 8.3 (H) 0.0 - 5.6 %      Comment:      Normal <5.7%   Prediabetes 5.7-6.4%    Diabetes 6.5% or higher     Note: Adopted from ADA consensus guidelines.   Hepatic panel (Albumin, ALT, AST, Bili, Alk Phos, TP)   Result Value Ref Range    Protein Total 7.9 6.4 - 8.3 g/dL    Albumin 4.4 3.5 - 5.2 g/dL    Bilirubin Total 0.3 <=1.2 mg/dL    Alkaline Phosphatase 102 40 - 129 U/L    AST 67 (H) 0 - 45 U/L      Comment:      Reference intervals for this test were updated on 6/12/2023 to more accurately reflect our healthy population. There may be differences in the flagging of prior results with similar values performed with this method. Interpretation of those prior results can be made in the context of the updated reference intervals.     (H) 0 - 70 U/L      Comment:      Reference intervals for this test were updated on 6/12/2023 to more accurately reflect our healthy population. There may be differences in the flagging of prior results with similar values performed with this method. Interpretation of those prior results can be made in the context of the updated reference intervals.      Bilirubin Direct <0.20 0.00 - 0.30 mg/dL   Albumin Random Urine Quantitative with Creat Ratio   Result Value Ref Range     Creatinine Urine mg/dL 235.0 mg/dL      Comment:      The reference ranges have not been established in urine creatinine. The results should be integrated into the clinical context for interpretation.    Albumin Urine mg/L 25.2 mg/L      Comment:      The reference ranges have not been established in urine albumin. The results should be integrated into the clinical context for interpretation.    Albumin Urine mg/g Cr 10.72 0.00 - 17.00 mg/g Cr      Comment:      Microalbuminuria is defined as an albumin:creatinine ratio of 17 to 299 for males and 25 to 299 for females. A ratio of albumin:creatinine of 300 or higher is indicative of overt proteinuria.  Due to biologic variability, positive results should be confirmed by a second, first-morning random or 24-hour timed urine specimen. If there is discrepancy, a third specimen is recommended. When 2 out of 3 results are in the microalbuminuria range, this is evidence for incipient nephropathy and warrants increased efforts at glucose control, blood pressure control, and institution of therapy with an angiotensin-converting-enzyme (ACE) inhibitor (if the patient can tolerate it).         If you have any questions or concerns, please call the clinic at the number listed above.       Sincerely,      [facility]

## 2023-07-18 NOTE — TELEPHONE ENCOUNTER
Attempted to contact patient, unable to leave message as voicemail box is not set up. Please relay message below from Dr. Pérez if patient calls back.     ----- Message from Sheila Pérez MD sent at 7/18/2023 12:53 PM CDT -----  Liver enzymes are still elevated.  If you are okay with it I am going to test for hepatitis B and abnormal iron storage.  Should be able to use the blood that we good the other day.    Diabetes control has worsened.  A1c increased from 7.3 to 8.3.  I recommend Lifestyle modifications, including: Increasing intake of vegetables and fruits, decreasing intake of processed foods/carbohydrates/sugars, having regular physical activity.    Screening for kidney damage was normal.

## 2023-07-19 ENCOUNTER — TELEPHONE (OUTPATIENT)
Dept: FAMILY MEDICINE | Facility: CLINIC | Age: 63
End: 2023-07-19
Payer: COMMERCIAL

## 2023-07-19 DIAGNOSIS — R93.2 ABNORMAL LIVER ULTRASOUND: ICD-10-CM

## 2023-07-19 DIAGNOSIS — R74.8 ELEVATED LIVER ENZYMES: Primary | ICD-10-CM

## 2023-07-19 NOTE — TELEPHONE ENCOUNTER
Patient called in with assistance of an  to know test result obtained on 7/14/2023.  It appeared results are in but has yet to review by pcp.  Patient made aware and will wait for provider's office to call with result.    Will route encounter to pcp with patient request.     LALO MuellerN, RN  [facility]

## 2023-07-19 NOTE — TELEPHONE ENCOUNTER
RN made 2nd attempt to contact patient using a Maki , but no answer. Unable to leave a message as no voicemail set up. Will try patient later.    If patient calls back, please relay message below. Thanks    Samina Vasquez RN  [facility]    ----- Message from Sheila Pérez MD sent at 7/18/2023 12:53 PM CDT -----  Liver enzymes are still elevated.  If you are okay with it I am going to test for hepatitis B and abnormal iron storage.  Should be able to use the blood that we good the other day.    Diabetes control has worsened.  A1c increased from 7.3 to 8.3.  I recommend Lifestyle modifications, including: Increasing intake of vegetables and fruits, decreasing intake of processed foods/carbohydrates/sugars, having regular physical activity.    Screening for kidney damage was normal.

## 2023-07-20 NOTE — TELEPHONE ENCOUNTER
Hepatitis B labs ordered.  Unfortunately we will need to collect a new lab.  Please have patient make lab appointment to complete this.    I reviewed his medical history, and he had an abnormal liver ultrasound earlier this year at [facility].  I am going to order a a repeat liver ultrasound. He should get a call in 1-2 day to schedule this.

## 2023-07-20 NOTE — TELEPHONE ENCOUNTER
RN made 3rd attempt to call pt and relay Dr. Pérez's message. Could not get a hold of pt or leave a message.    Per protocol, letter sent.      Franki Coto Cem Say, BSN RN  [facility]

## 2023-07-20 NOTE — PROGRESS NOTES
Pt is schedule with [facility] on 8/12/23 and with [facility] on 8/16/23. Completing task.

## 2023-07-20 NOTE — TELEPHONE ENCOUNTER
Contacted patient and discussed results from 7/14/23.  Patient is agreeable to being tested for hep B and abnormal iron storage.  Verbalized understanding re: diet/lifestle changes to implement.

## 2023-07-21 ENCOUNTER — LAB (OUTPATIENT)
Dept: LAB | Facility: CLINIC | Age: 63
End: 2023-07-21
Payer: COMMERCIAL

## 2023-07-21 DIAGNOSIS — R93.2 ABNORMAL LIVER ULTRASOUND: ICD-10-CM

## 2023-07-21 DIAGNOSIS — R74.8 ELEVATED LIVER ENZYMES: ICD-10-CM

## 2023-07-21 LAB
HBV SURFACE AB SERPL IA-ACNC: 157.17 M[IU]/ML
HBV SURFACE AB SERPL IA-ACNC: REACTIVE M[IU]/ML

## 2023-07-21 PROCEDURE — 86706 HEP B SURFACE ANTIBODY: CPT

## 2023-07-21 PROCEDURE — 36415 COLL VENOUS BLD VENIPUNCTURE: CPT

## 2023-07-21 NOTE — TELEPHONE ENCOUNTER
Contacted patient and scheduled for lab appointment today. Discussed liver ultrasound, he verbalized understanding and will expect a call to schedule this.     Future Appointments 7/21/2023 - 1/17/2024      Date Visit Type Length Department Provider     7/21/2023  9:15 AM LAB 15 min [facility]    Location Instructions:     We are located at [address] [place] [address]. We offer free parking in the lot on [address] across the street from the clinic. Please check in at the  through the main entrance.

## 2023-07-24 ENCOUNTER — TELEPHONE (OUTPATIENT)
Dept: FAMILY MEDICINE | Facility: CLINIC | Age: 63
End: 2023-07-24
Payer: COMMERCIAL

## 2023-07-24 PROBLEM — Z78.9 HEPATITIS B IMMUNE: Status: ACTIVE | Noted: 2023-07-24

## 2023-07-24 NOTE — TELEPHONE ENCOUNTER
----- Message from Sheila Pérez MD sent at 7/24/2023  9:12 AM CDT -----  Labs show that patient is immune to hepatitis B.

## 2023-07-24 NOTE — LETTER
July 26, 2023      Carmelo Jonny  [address] [place] [address]        Dear ,    We are writing to inform you of your test results.    You are immune to hepatitis B     Resulted Orders   Hepatitis B Surface Antibody   Result Value Ref Range    Hepatitis B Surface Antibody Instrument Value 157.17 <8.00 m[IU]/mL    Hepatitis B Surface Antibody Reactive       Comment:      Patient is considered to be immune to infection with hepatitis B when the value is greater than or equal to 12.00 mIU/mL.       If you have any questions or concerns, please call the clinic at the number listed above.       Sincerely,

## 2023-07-25 NOTE — TELEPHONE ENCOUNTER
Unable to leave message x 2. Please review message thread below and advise the patient as indicated. Please schedule if necessary or indicated in message thread. Use  line to contact patient :ana lilia

## 2023-07-26 NOTE — TELEPHONE ENCOUNTER
Unable to leave message x 3. Please review message thread below and advise the patient as indicated. Please schedule if necessary or indicated in message thread. Letter sent.

## 2023-07-27 ENCOUNTER — TELEPHONE (OUTPATIENT)
Dept: FAMILY MEDICINE | Facility: CLINIC | Age: 63
End: 2023-07-27
Payer: COMMERCIAL

## 2023-07-27 ENCOUNTER — HOSPITAL ENCOUNTER (OUTPATIENT)
Dept: ULTRASOUND IMAGING | Facility: HOSPITAL | Age: 63
Discharge: HOME | End: 2023-07-27
Attending: STUDENT IN AN ORGANIZED HEALTH CARE EDUCATION/TRAINING PROGRAM | Admitting: STUDENT IN AN ORGANIZED HEALTH CARE EDUCATION/TRAINING PROGRAM
Payer: COMMERCIAL

## 2023-07-27 DIAGNOSIS — R74.8 ELEVATED LIVER ENZYMES: ICD-10-CM

## 2023-07-27 DIAGNOSIS — R93.2 ABNORMAL LIVER ULTRASOUND: ICD-10-CM

## 2023-07-27 PROBLEM — K76.0 FATTY LIVER: Status: ACTIVE | Noted: 2023-07-18

## 2023-07-27 PROCEDURE — 76705 ECHO EXAM OF ABDOMEN: CPT

## 2023-07-27 NOTE — TELEPHONE ENCOUNTER
Contacted patient using an  and relayed message below from Dr Pérez.    Samina Vasquez RN  [facility]    ----- Message from Sheila Pérez MD sent at 7/27/2023 11:54 AM CDT -----  Ultrasound of the liver shows that you have accumulated in the liver.  This is usually due to lifestyle. If this does not improve, over time it can lead to liver failure.  I recommend Lifestyle modifications, including: Increasing intake of vegetables and fruits, decreasing intake of processed foods/carbohydrates/sugars, having regular physical activity.

## 2023-07-27 NOTE — TELEPHONE ENCOUNTER
----- Message from Sheila Pérez MD sent at 7/27/2023 11:54 AM CDT -----  Ultrasound of the liver shows that you have accumulated in the liver.  This is usually due to lifestyle. If this does not improve, over time it can lead to liver failure.  I recommend Lifestyle modifications, including: Increasing intake of vegetables and fruits, decreasing intake of processed foods/carbohydrates/sugars, having regular physical activity.

## 2023-08-19 ENCOUNTER — TRANSFERRED RECORDS (OUTPATIENT)
Dept: HEALTH INFORMATION MANAGEMENT | Facility: CLINIC | Age: 63
End: 2023-08-19

## 2023-08-19 LAB — RETINOPATHY: NEGATIVE

## 2024-02-13 ENCOUNTER — PATIENT OUTREACH (OUTPATIENT)
Dept: GASTROENTEROLOGY | Facility: CLINIC | Age: 64
End: 2024-02-13
Payer: COMMERCIAL

## 2024-05-13 ENCOUNTER — ORDERS ONLY (AUTO-RELEASED) (OUTPATIENT)
Dept: FAMILY MEDICINE | Facility: CLINIC | Age: 64
End: 2024-05-13

## 2024-05-13 ENCOUNTER — OFFICE VISIT (OUTPATIENT)
Dept: FAMILY MEDICINE | Facility: CLINIC | Age: 64
End: 2024-05-13
Payer: COMMERCIAL

## 2024-05-13 VITALS
HEART RATE: 60 BPM | BODY MASS INDEX: 29.17 KG/M2 | DIASTOLIC BLOOD PRESSURE: 92 MMHG | SYSTOLIC BLOOD PRESSURE: 180 MMHG | RESPIRATION RATE: 20 BRPM | TEMPERATURE: 97.8 F | WEIGHT: 138 LBS

## 2024-05-13 DIAGNOSIS — Z87.891 PERSONAL HISTORY OF TOBACCO USE: ICD-10-CM

## 2024-05-13 DIAGNOSIS — E11.9 TYPE 2 DIABETES MELLITUS WITHOUT COMPLICATION, WITHOUT LONG-TERM CURRENT USE OF INSULIN (H): Primary | ICD-10-CM

## 2024-05-13 DIAGNOSIS — E78.2 MIXED HYPERLIPIDEMIA: ICD-10-CM

## 2024-05-13 DIAGNOSIS — Z12.11 SCREEN FOR COLON CANCER: ICD-10-CM

## 2024-05-13 DIAGNOSIS — I10 PRIMARY HYPERTENSION: ICD-10-CM

## 2024-05-13 LAB
ALBUMIN SERPL BCG-MCNC: 4.3 G/DL (ref 3.5–5.2)
ALP SERPL-CCNC: 100 U/L (ref 40–150)
ALT SERPL W P-5'-P-CCNC: 90 U/L (ref 0–70)
ANION GAP SERPL CALCULATED.3IONS-SCNC: 8 MMOL/L (ref 7–15)
AST SERPL W P-5'-P-CCNC: 58 U/L (ref 0–45)
BILIRUB SERPL-MCNC: 0.3 MG/DL
BUN SERPL-MCNC: 15.4 MG/DL (ref 8–23)
CALCIUM SERPL-MCNC: 8.9 MG/DL (ref 8.8–10.2)
CHLORIDE SERPL-SCNC: 101 MMOL/L (ref 98–107)
CHOLEST SERPL-MCNC: 298 MG/DL
CREAT SERPL-MCNC: 1.26 MG/DL (ref 0.67–1.17)
CREAT UR-MCNC: 80.3 MG/DL
DEPRECATED HCO3 PLAS-SCNC: 27 MMOL/L (ref 22–29)
EGFRCR SERPLBLD CKD-EPI 2021: 64 ML/MIN/1.73M2
ERYTHROCYTE [DISTWIDTH] IN BLOOD BY AUTOMATED COUNT: 13 % (ref 10–15)
FASTING STATUS PATIENT QL REPORTED: NO
FASTING STATUS PATIENT QL REPORTED: NO
GLUCOSE SERPL-MCNC: 265 MG/DL (ref 70–99)
HBA1C MFR BLD: 8.2 % (ref 0–5.6)
HCT VFR BLD AUTO: 42.8 % (ref 40–53)
HDLC SERPL-MCNC: 37 MG/DL
HGB BLD-MCNC: 13.9 G/DL (ref 13.3–17.7)
LDLC SERPL CALC-MCNC: 185 MG/DL
MCH RBC QN AUTO: 26.3 PG (ref 26.5–33)
MCHC RBC AUTO-ENTMCNC: 32.5 G/DL (ref 31.5–36.5)
MCV RBC AUTO: 81 FL (ref 78–100)
MICROALBUMIN UR-MCNC: 12.3 MG/L
MICROALBUMIN/CREAT UR: 15.32 MG/G CR (ref 0–17)
NONHDLC SERPL-MCNC: 261 MG/DL
PLATELET # BLD AUTO: 159 10E3/UL (ref 150–450)
POTASSIUM SERPL-SCNC: 3.8 MMOL/L (ref 3.4–5.3)
PROT SERPL-MCNC: 7.2 G/DL (ref 6.4–8.3)
RBC # BLD AUTO: 5.29 10E6/UL (ref 4.4–5.9)
SODIUM SERPL-SCNC: 136 MMOL/L (ref 135–145)
TRIGL SERPL-MCNC: 380 MG/DL
WBC # BLD AUTO: 6.2 10E3/UL (ref 4–11)

## 2024-05-13 PROCEDURE — T1013 SIGN LANG/ORAL INTERPRETER: HCPCS | Mod: U4

## 2024-05-13 PROCEDURE — 36415 COLL VENOUS BLD VENIPUNCTURE: CPT | Performed by: FAMILY MEDICINE

## 2024-05-13 PROCEDURE — G0296 VISIT TO DETERM LDCT ELIG: HCPCS | Performed by: FAMILY MEDICINE

## 2024-05-13 PROCEDURE — 82570 ASSAY OF URINE CREATININE: CPT | Performed by: FAMILY MEDICINE

## 2024-05-13 PROCEDURE — 80061 LIPID PANEL: CPT | Performed by: FAMILY MEDICINE

## 2024-05-13 PROCEDURE — 96381 ADMN RSV MONOC ANTB IM NJX: CPT | Performed by: FAMILY MEDICINE

## 2024-05-13 PROCEDURE — 99215 OFFICE O/P EST HI 40 MIN: CPT | Mod: 25 | Performed by: FAMILY MEDICINE

## 2024-05-13 PROCEDURE — 85027 COMPLETE CBC AUTOMATED: CPT | Performed by: FAMILY MEDICINE

## 2024-05-13 PROCEDURE — 90678 RSV VACC PREF BIVALENT IM: CPT | Performed by: FAMILY MEDICINE

## 2024-05-13 PROCEDURE — 83036 HEMOGLOBIN GLYCOSYLATED A1C: CPT | Performed by: FAMILY MEDICINE

## 2024-05-13 PROCEDURE — 82043 UR ALBUMIN QUANTITATIVE: CPT | Performed by: FAMILY MEDICINE

## 2024-05-13 PROCEDURE — 80053 COMPREHEN METABOLIC PANEL: CPT | Performed by: FAMILY MEDICINE

## 2024-05-13 RX ORDER — ATORVASTATIN CALCIUM 40 MG/1
40 TABLET, FILM COATED ORAL DAILY
Qty: 90 TABLET | Refills: 3 | Status: SHIPPED | OUTPATIENT
Start: 2024-05-13

## 2024-05-13 RX ORDER — LANCETS
EACH MISCELLANEOUS
Qty: 100 EACH | Refills: 6 | Status: SHIPPED | OUTPATIENT
Start: 2024-05-13

## 2024-05-13 RX ORDER — LISINOPRIL 10 MG/1
10 TABLET ORAL DAILY
Qty: 90 TABLET | Refills: 3 | Status: SHIPPED | OUTPATIENT
Start: 2024-05-13

## 2024-05-13 NOTE — PROGRESS NOTES
Assessment & Plan     Type 2 diabetes mellitus without complication, without long-term current use of insulin (H)  Not adequately controlled, patient still under the impression that he has prediabetes, I had a long discussion with him, will refer to diabetes educator, resume metformin, add low-dose Jardiance, monitor blood sugar at home, new glucometer sent.  Follow-up with PCP in about 3 months.  - Comprehensive metabolic panel  - Hemoglobin A1c  - Lipid panel reflex to direct LDL Fasting  - CBC with platelets  - Albumin Random Urine Quantitative with Creat Ratio  - metFORMIN (GLUCOPHAGE) 500 MG tablet; 2 tabs po 2 times daily.  - blood glucose monitoring (NO BRAND SPECIFIED) meter device kit; Use to test blood sugar 2 times daily or as directed. Preferred blood glucose meter OR supplies to accompany: Blood Glucose Monitor Brands: per insurance.  - blood glucose (NO BRAND SPECIFIED) test strip; Use to test blood sugar 2 times daily or as directed. To accompany: Blood Glucose Monitor Brands: per insurance.  - thin (NO BRAND SPECIFIED) lancets; Use with lanceting device. To accompany: Blood Glucose Monitor Brands: per insurance.  - Amb Adult Diabetes Educator Referral - Routine; Future  - empagliflozin (JARDIANCE) 10 MG TABS tablet; Take 1 tablet (10 mg) by mouth daily    Screen for colon cancer  Discussed risk and benefit, patient elected to do the Cologuard test.  New order was signed.  - COLOGUARD([employer]); Future    Mixed hyperlipidemia    - atorvastatin (LIPITOR) 40 MG tablet; Take 1 tablet (40 mg) by mouth daily    Personal history of tobacco use  Discussed risks and benefits, 5 minutes spent in discussing smoking cessation, patient declined all treatment options stating that he would try to quit on his own.  He is in agreement to get a CT lung to screen for cancer.  - Prof fee: Shared Decision Making for Lung Cancer Screening  - CT Chest Lung Cancer Scrn Low Dose wo; Future    Primary  25-Nov-2020 "hypertension  Moderately elevated today, discussed healthy lifestyle changes, resume lisinopril, recheck in about 4 weeks to ensure improvement.  - lisinopril (ZESTRIL) 10 MG tablet; Take 1 tablet (10 mg) by mouth daily    Review of external notes as documented elsewhere in note  40 minutes spent by me on the date of the encounter doing chart review, review of outside records, review of test results, interpretation of tests, patient visit, and documentation       BMI  Estimated body mass index is 29.17 kg/m  as calculated from the following:    Height as of 7/3/23: 1.465 m (4' 9.68\").    Weight as of this encounter: 62.6 kg (138 lb).   Weight management plan: Discussed healthy diet and exercise guidelines      Work on weight loss  Regular exercise    Chemo Rhodes is a 63 year old, presenting for the following health issues:  Recheck Medication      5/13/2024    10:52 AM   Additional Questions   Roomed by Brandon MURGUIA     History of Present Illness       Reason for visit:  Med check    He eats 0-1 servings of fruits and vegetables daily.He consumes 0 sweetened beverage(s) daily.He exercises with enough effort to increase his heart rate 9 or less minutes per day.  He exercises with enough effort to increase his heart rate 3 or less days per week.   He is taking medications regularly.       Patient stating that he has prediabetes, he ran out of his metformin couple of months ago, he like a refill.  Denies any excessive thirst or polyuria.  He just came back from [place] visiting family member.  Was taking metformin 1 tablet twice daily, even though instructed to take 2 tablet twice daily.  Not checking his blood sugar, A1c is at 8.2% today, not taking his blood pressure.  Also was prescribed lisinopril stopped taking after running out of medication.  Blood pressure mildly elevated today.  Is a smoker, used to smoke about 10 to 15 cigarettes a day, was able to cut down to about 4 cigarettes a day.   after discussion " with him,he is interested on screening for lung cancer/damage from smoking, but not interested on quitting smoking.      Review of Systems  Constitutional, HEENT, cardiovascular, pulmonary, gi and gu systems are negative, except as otherwise noted.      Objective    BP (!) 199/103 (BP Location: Right arm, Patient Position: Sitting, Cuff Size: Adult Regular)   Pulse 60   Temp 97.8  F (36.6  C) (Temporal)   Resp 20   Wt 62.6 kg (138 lb)   BMI 29.17 kg/m    Body mass index is 29.17 kg/m .  Physical Exam   GENERAL: alert and no distress  NECK: no adenopathy, no asymmetry, masses, or scars  RESP: Distant lung sounds, otherwise no rhonchi's or wheezes.  CV: regular rate and rhythm, normal S1 S2, no S3 or S4, no murmur, click or rub, no peripheral edema  ABDOMEN: soft, nontender, no hepatosplenomegaly, no masses and bowel sounds normal  MS: no gross musculoskeletal defects noted, no edema    Results for orders placed or performed in visit on 05/13/24   Hemoglobin A1c     Status: Abnormal   Result Value Ref Range    Hemoglobin A1C 8.2 (H) 0.0 - 5.6 %   CBC with platelets     Status: Abnormal   Result Value Ref Range    WBC Count 6.2 4.0 - 11.0 10e3/uL    RBC Count 5.29 4.40 - 5.90 10e6/uL    Hemoglobin 13.9 13.3 - 17.7 g/dL    Hematocrit 42.8 40.0 - 53.0 %    MCV 81 78 - 100 fL    MCH 26.3 (L) 26.5 - 33.0 pg    MCHC 32.5 31.5 - 36.5 g/dL    RDW 13.0 10.0 - 15.0 %    Platelet Count 159 150 - 450 10e3/uL           Signed Electronically by: Lilliam Reese MD    This note was completed in part using a voice recognition software, any grammatical or context distortion are unintentional and inherent to the software.    Prior to immunization administration, verified patients identity using patient s name and date of birth. Please see Immunization Activity for additional information.     Screening Questionnaire for Adult Immunization    Are you sick today?   No   Do you have allergies to medications, food, a vaccine component  or latex?   No   Have you ever had a serious reaction after receiving a vaccination?   No   Do you have a long-term health problem with heart, lung, kidney, or metabolic disease (e.g., diabetes), asthma, a blood disorder, no spleen, complement component deficiency, a cochlear implant, or a spinal fluid leak?  Are you on long-term aspirin therapy?   Yes   Do you have cancer, leukemia, HIV/AIDS, or any other immune system problem?   No   Do you have a parent, brother, or sister with an immune system problem?   No   In the past 3 months, have you taken medications that affect  your immune system, such as prednisone, other steroids, or anticancer drugs; drugs for the treatment of rheumatoid arthritis, Crohn s disease, or psoriasis; or have you had radiation treatments?   No   Have you had a seizure, or a brain or other nervous system problem?   No   During the past year, have you received a transfusion of blood or blood    products, or been given immune (gamma) globulin or antiviral drug?   No   For women: Are you pregnant or is there a chance you could become       pregnant during the next month?   No   Have you received any vaccinations in the past 4 weeks?   No     Immunization questionnaire was positive for at least one answer.  Notified provider.      Patient instructed to remain in clinic for 15 minutes afterwards, and to report any adverse reactions.     Screening performed by Brandon Pedersen MA on 5/13/2024 at 11:02 AM.

## 2024-05-13 NOTE — LETTER
May 15, 2024      Carmelo Fuller  [address] [place] [address]        Dear ,    We are writing to inform you of your test results.    Blood sugar, cholesterol were moderately elevated,Kidney function was just mildly abnormal, start taking your medication as directed, follow-up with Dr. Pérez.    Resulted Orders   Comprehensive metabolic panel   Result Value Ref Range    Sodium 136 135 - 145 mmol/L      Comment:      Reference intervals for this test were updated on 09/26/2023 to more accurately reflect our healthy population. There may be differences in the flagging of prior results with similar values performed with this method. Interpretation of those prior results can be made in the context of the updated reference intervals.     Potassium 3.8 3.4 - 5.3 mmol/L    Carbon Dioxide (CO2) 27 22 - 29 mmol/L    Anion Gap 8 7 - 15 mmol/L    Urea Nitrogen 15.4 8.0 - 23.0 mg/dL    Creatinine 1.26 (H) 0.67 - 1.17 mg/dL    GFR Estimate 64 >60 mL/min/1.73m2    Calcium 8.9 8.8 - 10.2 mg/dL    Chloride 101 98 - 107 mmol/L    Glucose 265 (H) 70 - 99 mg/dL    Alkaline Phosphatase 100 40 - 150 U/L      Comment:      Reference intervals for this test were updated on 11/14/2023 to more accurately reflect our healthy population. There may be differences in the flagging of prior results with similar values performed with this method. Interpretation of those prior results can be made in the context of the updated reference intervals.    AST 58 (H) 0 - 45 U/L      Comment:      Reference intervals for this test were updated on 6/12/2023 to more accurately reflect our healthy population. There may be differences in the flagging of prior results with similar values performed with this method. Interpretation of those prior results can be made in the context of the updated reference intervals.    ALT 90 (H) 0 - 70 U/L      Comment:      Reference intervals for this test were updated on 6/12/2023 to more accurately reflect our  healthy population. There may be differences in the flagging of prior results with similar values performed with this method. Interpretation of those prior results can be made in the context of the updated reference intervals.      Protein Total 7.2 6.4 - 8.3 g/dL    Albumin 4.3 3.5 - 5.2 g/dL    Bilirubin Total 0.3 <=1.2 mg/dL    Patient Fasting > 8hrs? No    Hemoglobin A1c   Result Value Ref Range    Hemoglobin A1C 8.2 (H) 0.0 - 5.6 %      Comment:      Normal <5.7%   Prediabetes 5.7-6.4%    Diabetes 6.5% or higher     Note: Adopted from ADA consensus guidelines.   Lipid panel reflex to direct LDL Fasting   Result Value Ref Range    Cholesterol 298 (H) <200 mg/dL    Triglycerides 380 (H) <150 mg/dL    Direct Measure HDL 37 (L) >=40 mg/dL    LDL Cholesterol Calculated 185 (H) <=100 mg/dL    Non HDL Cholesterol 261 (H) <130 mg/dL    Patient Fasting > 8hrs? No     Narrative    Cholesterol  Desirable:  <200 mg/dL    Triglycerides  Normal:  Less than 150 mg/dL  Borderline High:  150-199 mg/dL  High:  200-499 mg/dL  Very High:  Greater than or equal to 500 mg/dL    Direct Measure HDL  Female:  Greater than or equal to 50 mg/dL   Male:  Greater than or equal to 40 mg/dL    LDL Cholesterol  Desirable:  <100mg/dL  Above Desirable:  100-129 mg/dL   Borderline High:  130-159 mg/dL   High:  160-189 mg/dL   Very High:  >= 190 mg/dL    Non HDL Cholesterol  Desirable:  130 mg/dL  Above Desirable:  130-159 mg/dL  Borderline High:  160-189 mg/dL  High:  190-219 mg/dL  Very High:  Greater than or equal to 220 mg/dL   CBC with platelets   Result Value Ref Range    WBC Count 6.2 4.0 - 11.0 10e3/uL    RBC Count 5.29 4.40 - 5.90 10e6/uL    Hemoglobin 13.9 13.3 - 17.7 g/dL    Hematocrit 42.8 40.0 - 53.0 %    MCV 81 78 - 100 fL    MCH 26.3 (L) 26.5 - 33.0 pg    MCHC 32.5 31.5 - 36.5 g/dL    RDW 13.0 10.0 - 15.0 %    Platelet Count 159 150 - 450 10e3/uL   Albumin Random Urine Quantitative with Creat Ratio   Result Value Ref Range     Creatinine Urine mg/dL 80.3 mg/dL      Comment:      The reference ranges have not been established in urine creatinine. The results should be integrated into the clinical context for interpretation.    Albumin Urine mg/L 12.3 mg/L      Comment:      The reference ranges have not been established in urine albumin. The results should be integrated into the clinical context for interpretation.    Albumin Urine mg/g Cr 15.32 0.00 - 17.00 mg/g Cr      Comment:      Microalbuminuria is defined as an albumin:creatinine ratio of 17 to 299 for males and 25 to 299 for females. A ratio of albumin:creatinine of 300 or higher is indicative of overt proteinuria.  Due to biologic variability, positive results should be confirmed by a second, first-morning random or 24-hour timed urine specimen. If there is discrepancy, a third specimen is recommended. When 2 out of 3 results are in the microalbuminuria range, this is evidence for incipient nephropathy and warrants increased efforts at glucose control, blood pressure control, and institution of therapy with an angiotensin-converting-enzyme (ACE) inhibitor (if the patient can tolerate it).         If you have any questions or concerns, please call the clinic at the number listed above.       Sincerely,      Lilliam Reese MD

## 2024-05-13 NOTE — PROGRESS NOTES
Lung Cancer Screening Shared Decision Making Visit     Carmelo Fuller, a 63 year old male, is eligible for lung cancer screening    History   Smoking Status     Never   Smokeless Tobacco     Never       I have discussed with patient the risks and benefits of screening for lung cancer with low-dose CT.     The risks include:    radiation exposure: one low dose chest CT has as much ionizing radiation as about 15 chest x-rays, or 6 months of background radiation living in [place]      false positives: most findings/nodules are NOT cancer, but some might still require additional diagnostic evaluation, including biopsy    over-diagnosis: some slow growing cancers that might never have been clinically significant will be detected and treated unnecessarily     The benefit of early detection of lung cancer is contingent upon adherence to annual screening or more frequent follow up if indicated.     Furthermore, to benefit from screening, Gyi must be willing and able to undergo diagnostic procedures, if indicated. Although no specific guide is available for determining severity of comorbidities, it is reasonable to withhold screening in patients who have greater mortality risk from other diseases.     We did discuss that the best way to prevent lung cancer is to not smoke.    Some patients may value a numeric estimation of lung cancer risk when evaluating if lung cancer screening is right for them, here is one calculator:    ShouldIScreen

## 2024-05-13 NOTE — PATIENT INSTRUCTIONS
Lung Cancer Screening   Frequently Asked Questions  If you are at high-risk for lung cancer, getting screened with low-dose computed tomography (LDCT) every year can help save your life. This handout offers answers to some of the most common questions about lung cancer screening. If you have other questions, please call [phone] ([phone]).     What is it?  Lung cancer screening uses special X-ray technology to create an image of your lung tissue. The exam is quick and easy and takes less than 10 seconds. We don t give you any medicine or use any needles. You can eat before and after the exam. You don t need to change your clothes as long as the clothing on your chest doesn t contain metal. But, you do need to be able to hold your breath for at least 6 seconds during the exam.    What is the goal of lung cancer screening?  The goal of lung cancer screening is to save lives. Many times, lung cancer is not found until a person starts having physical symptoms. Lung cancer screening can help detect lung cancer in the earliest stages when it may be easier to treat.    Who should be screened for lung cancer?  We suggest lung cancer screening for anyone who is at high-risk for lung cancer. You are in the high-risk group if you:      are between the ages of 55 and 79, and    have smoked at least 1 pack of cigarettes a day for 20 or more years, and    still smoke or have quit within the past 15 years.    However, if you have a new cough or shortness of breath, you should talk to your doctor before being screened.    Why does it matter if I have symptoms?  Certain symptoms can be a sign that you have a condition in your lungs that should be checked and treated by your doctor. These symptoms include fever, chest pain, a new or changing cough, shortness of breath that you have never felt before, coughing up blood or unexplained weight loss. Having any of these symptoms can greatly affect the results of lung  cancer screening.       Should all smokers get an LDCT lung cancer screening exam?  It depends. Lung cancer screening is for a very specific group of men and women who have a history of heavy smoking over a long period of time (see  Who should be screened for lung cancer  above).  I am in the high-risk group, but have been diagnosed with cancer in the past. Is LDCT lung cancer screening right for me?  In some cases, you should not have LDCT lung screening, such as when your doctor is already following your cancer with CT scan studies. Your doctor will help you decide if LDCT lung screening is right for you.  Do I need to have a screening exam every year?  Yes. If you are in the high-risk group described earlier, you should get an LDCT lung cancer screening exam every year until you are 79, or are no longer willing or able to undergo screening and possible procedures to diagnose and treat lung cancer.  How effective is LDCT at preventing death from lung cancer?  Studies have shown that LDCT lung cancer screening can lower the risk of death from lung cancer by 20 percent in people who are at high-risk.  What are the risks?  There are some risks and limitations of LDCT lung cancer screening. We want to make sure you understand the risks and benefits, so please let us know if you have any questions. Your doctor may want to talk with you more about these risks.    Radiation exposure: As with any exam that uses radiation, there is a very small increased risk of cancer. The amount of radiation in LDCT is small--about the same amount a person would get from a mammogram. Your doctor orders the exam when he or she feels the potential benefits outweigh the risks.    False negatives: No test is perfect, including LDCT. It is possible that you may have a medical condition, including lung cancer, that is not found during your exam. This is called a false negative result.    False positives and more testing: LDCT very often finds  something in the lung that could be cancer, but in fact is not. This is called a false positive result. False positive tests often cause anxiety. To make sure these findings are not cancer, you may need to have more tests. These tests will be done only if you give us permission. Sometimes patients need a treatment that can have side effects, such as a biopsy. For more information on false positives, see  What can I expect from the results?     Findings not related to lung cancer: Your LDCT exam also takes pictures of areas of your body next to your lungs. In a very small number of cases, the CT scan will show an abnormal finding in one of these areas, such as your kidneys, adrenal glands, liver or thyroid. This finding may not be serious, but you may need more tests. Your doctor can help you decide what other tests you may need, if any.  What can I expect from the results?  About 1 out of 4 LDCT exams will find something that may need more tests. Most of the time, these findings are lung nodules. Lung nodules are very small collections of tissue in the lung. These nodules are very common, and the vast majority--more than 97 percent--are not cancer (benign). Most are normal lymph nodes or small areas of scarring from past infections.  But, if a small lung nodule is found to be cancer, the cancer can be cured more than 90 percent of the time. To know if the nodule is cancer, we may need to get more images before your next yearly screening exam. If the nodule has suspicious features (for example, it is large, has an odd shape or grows over time), we will refer you to a specialist for further testing.  Will my doctor also get the results?  Yes. Your doctor will get a copy of your results.  Is it okay to keep smoking now that there s a cancer screening exam?  No. Tobacco is one of the strongest cancer-causing agents. It causes not only lung cancer, but other cancers and cardiovascular (heart) diseases as well. The damage  caused by smoking builds over time. This means that the longer you smoke, the higher your risk of disease. While it is never too late to quit, the sooner you quit, the better.  Where can I find help to quit smoking?  The best way to prevent lung cancer is to stop smoking. If you have already quit smoking, congratulations and keep it up! For help on quitting smoking, please call [employer] at 8-289-QUITNOW (1-969.986.3430) or the American [employer] Society at 1-839.216.5118 to find local resources near you.  One-on-one health coaching:  If you d prefer to work individually with a health care provider on tobacco cessation, we offer:      Medication Therapy Management:  Our specially trained pharmacists work closely with you and your doctor to help you quit smoking.  Call [phone] or [phone] (toll free).

## 2024-05-20 ENCOUNTER — TELEPHONE (OUTPATIENT)
Dept: FAMILY MEDICINE | Facility: CLINIC | Age: 64
End: 2024-05-20

## 2024-05-20 ENCOUNTER — ALLIED HEALTH/NURSE VISIT (OUTPATIENT)
Dept: FAMILY MEDICINE | Facility: CLINIC | Age: 64
End: 2024-05-20
Payer: COMMERCIAL

## 2024-05-20 DIAGNOSIS — Z12.11 SCREEN FOR COLON CANCER: Primary | ICD-10-CM

## 2024-05-20 DIAGNOSIS — E11.9 TYPE 2 DIABETES MELLITUS WITHOUT COMPLICATION, WITHOUT LONG-TERM CURRENT USE OF INSULIN (H): ICD-10-CM

## 2024-05-20 PROCEDURE — 99207 PR NO CHARGE NURSE ONLY: CPT

## 2024-05-20 NOTE — TELEPHONE ENCOUNTER
"Patient presented to clinic with questions about diabetic medications. Per patient, he just received a blood glucose testing kit last week and has only checked blood glucose a few times at home.    This morning, patient's fasting blood sugar was 103mg/dL. Patient wonders if he should stop taking diabetic medications, as reading was before eating anything for the day or taking medications.    Writer advised patient standard \"normal range\" for fasting blood glucose is 77-99mg/dL, but will route concerns to PCP/covering provider for final recommendations. Advised patient to continue with medications as normal, clinic will contact patient with any changes.    Reviewed symptoms of hypoglycemia with patient, encouraged patient to contact clinic if experiencing symptoms or with any questions regarding blood glucose readings. Patient will keep record of blood sugars and bring to 6/5/24 appt with diabetic educator.    Margarita Palomino RN  [facility]  "

## 2024-05-20 NOTE — PROGRESS NOTES
"Patient presented to clinic for Cologuard teaching, as well as questions regarding blood glucose testing prior to 6/5/24 appointment with diabetic educator.    Writer showed patient instructional video from Cologuard website, explaining each step of the video with help of [employer]  via telephone. Then demonstrated instructions a second time using clinic's Integrated Micro-Chromatography Systemso Cologuard kit. Answered patient questions about what containers need to be return to Cologuard, collection and return timing, and labeling specimens as well as box for return. Patient verbalized understanding of process, denied additional questions at this time.    Patient has started checking blood sugars at home after recently starting medication for diabetes. Per patient, he checked blood sugar this morning prior to eating breakfast or taking any medication. Patient reports blood sugar was 103 at this time, wonders if he should stop taking diabetic medications. Patient denies symptoms of hypoglycemia.    Advised patient:   RN will forward concerns to PCP for any recommendations (see TE from 5/20/24).   \"Normal range\" for fasting blood glucose is 70-99.  Keep record of your blood sugar readings, including date/time of reading, fasting/non-fasting and bring to your appointment with diabetic educator.  Provided clinic contact info, advised patient to call clinic if experiencing symptoms of hypoglycemia or if additional questions about diabetes/blood glucose.    Margarita Palomino RN  [facility]          "

## 2024-05-22 NOTE — TELEPHONE ENCOUNTER
Returned call to patient and relayed message below with  assistance. Patient verbalized understanding. Will bring BG log to appointment with DM education on 6/5.      -------------------------------------  Lilliam Reese MD   to [facility]    5/20/24 12:58 PM  Fasting blood sugar looks good, meaning that the medication that patient is taking is working, patient should continue with the same and if there is any concern follow-up with diabetes educator.

## 2024-06-05 ENCOUNTER — TELEPHONE (OUTPATIENT)
Dept: EDUCATION SERVICES | Facility: CLINIC | Age: 64
End: 2024-06-05

## 2024-06-05 NOTE — TELEPHONE ENCOUNTER
Patient was a no show for today's appointment.     Patient was contacted to reschedule.     Action taken: follow-up orders placed and UTL[facility] for patient to reschedule as the voice mail was not set up  .

## 2024-06-07 ENCOUNTER — PATIENT OUTREACH (OUTPATIENT)
Dept: GERIATRIC MEDICINE | Facility: CLINIC | Age: 64
End: 2024-06-07
Payer: COMMERCIAL

## 2024-06-07 NOTE — LETTER
June 7, 2024    JUVENCIOSHAWNA CHILO  [address]  [place] [address]    Dear  Carmelo,    Welcome to [facility] s MSC+ health program. My name is Gayle Chavez RN, PHN. I am your MSC+ care coordinator. You are eligible for Care Coordination through [facility] MSC+ plan.    As your care coordinator, we ll:  Meet to go over your care coordination benefits  Talk about your physical and mental health care needs   Review your preventative care needs  Create a plan that meets your needs with the services you choose    What happens next?  I ll call you soon to introduce myself and tell you more about my role. We ll then plan time to go over your health and safety needs. Our goal is to keep you as healthy and independent as possible.    Soon, you will receive a new MSC+ member identification (ID) card from [facility]. When you receive it, please use this card along with your [place] [facility] card and Prescription Drug Coverage Program card. When you receive, it please use this card where you get your health services. If you have Medicare, you will need to show your Medicare card when you get health services.    The MSC+ care coordination program is voluntary and offered to you at no cost. If you wish to stop being in the care coordination program or have questions, call me at [phone]. If you reach my voicemail, leave a message and your phone number. TTY users, call the [place] Relay at 688 or [phone] (ymmqoz-os-ulndrm relay service).    Sincerely,      Gayle Chavez RN, PHN  [phone]  [email or web address]    [id number] accepted   [id number]       [id number] (07/2022)

## 2024-06-07 NOTE — PROGRESS NOTES
[facility] Care Coordination Contact    Member became effective with  [employer] on 06/01/2024 with [facility] MSC+.  Previous Health Plan:  [facility]  Previous Care System: [facility]  Previous care coordinators name and number: N/A  Waiver Type: N/A  Last MMIS Entry: Date N/A and Type N/A no entries  MMIS visit date (and type) if different from above: N/A no entries  Services Listed in MMIS:   UTF received: No UTF to request  Mailed welcome letter and [facility] When to Contact Your Care Coordinator  Address/Phone discrepancy: N/A  MnChoices: Yes      Ana Maria Han  Care Management Specialist  [facility]  [phone]

## 2024-06-09 LAB — NONINV COLON CA DNA+OCC BLD SCRN STL QL: NORMAL

## 2024-06-26 ENCOUNTER — PATIENT OUTREACH (OUTPATIENT)
Dept: GERIATRIC MEDICINE | Facility: CLINIC | Age: 64
End: 2024-06-26
Payer: COMMERCIAL

## 2024-06-26 NOTE — LETTER
June 27, 2024    CARMELO WOO  [address]  [place] [address]    Dear Carmelo:     I m your care coordinator. I ve been unable to reach you by phone. I am writing to ask you or your authorized representative to call me at [phone]. If you reach my voicemail, leave a message with your daytime phone number. Include a date and time that I can call you. If you are hearing impaired, call the [place] Relay at 271 or [phone] (apgbfp-jd-ztvfyc relay service).    The reason I am trying to reach you is:     [x] To schedule an assessment  [] For your six (6)-month check-in  [x] Other:      Please call me as soon as you receive this letter. I look forward to speaking with you.    Sincerely,    Gayle Chavez RN, PHN  [phone]  [email or web address]        [id number] accepted  [id number]                                                                        B  (08/2022)

## 2024-06-26 NOTE — PROGRESS NOTES
[facility] Care Coordination Contact    Completed 4 attempts to reach client with no response.  Member is officially unable to contact effective today.  Completed MMIS entry.  Completed health plan required [facility] POC.    Follow-up Plan: CC will attempt to reach member in six months.    This CC note routed to PCP, Sheila Pérez.    Gayle Chavez RN, PHN   [facility]  [phone]

## 2024-06-27 NOTE — PROGRESS NOTES
"[facility] Care Coordination Contact    Per CC, mailed client an \"Unable to Contact\" letter.    Valeria Ceja  Care Management Specialist   [facility]   [phone]    "

## 2024-07-05 ENCOUNTER — PATIENT OUTREACH (OUTPATIENT)
Dept: GASTROENTEROLOGY | Facility: CLINIC | Age: 64
End: 2024-07-05
Payer: COMMERCIAL

## 2024-07-05 DIAGNOSIS — Z12.11 SPECIAL SCREENING FOR MALIGNANT NEOPLASMS, COLON: Primary | ICD-10-CM

## 2024-07-05 NOTE — LETTER
7/5/2024      Carmelo Fuller  [address]  [place] [address]    Cally Rhodes,    We hope this letter finds you doing well.     Your health is important to us at [facility]. Our records indicate that you could be due, or possibly overdue, for a screening or surveillance colonoscopy.    An order has been placed for you to have this completed. Our scheduling team will be reaching out to you to assist in getting this scheduled. If you do not hear from them within 7 days or you would like to schedule sooner, please call [phone], option 2 for endoscopy scheduling.     If you believe this is in error and have already had a colonoscopy done, please have your results and recommendations faxed to [phone].    If you have questions about this order or have further concerns, please reach out to your primary care provider.     Carlos     Colorectal Cancer RN Screening Team  [facility]

## 2024-07-05 NOTE — PROGRESS NOTES
"CRC Screening Colonoscopy Referral Review    Patient meets the inclusion criteria for screening colonoscopy standing order.    Ordering/Referring Provider:  Sheila Pérez      BMI: Estimated body mass index is 29.17 kg/m  as calculated from the following:    Height as of 7/3/23: 1.465 m (4' 9.68\").    Weight as of 5/13/24: 62.6 kg (138 lb).     Sedation:  Does patient have any of the following conditions affecting sedation?  No medical conditions affecting sedation.    Previous Scopes:  Any previous recommendations or follow up needs based on previous scope?  na / No recommendations.    Medical Concerns to Postpone Order:  Does patient have any of the following medical concerns that should postpone/delay colonoscopy referral?  No medical conditions affecting colonoscopy referral.    Final Referral Details:  Based on patient's medical history patient is appropriate for referral order with moderate sedation. If patient's BMI > 50 do not schedule in ASC.  "

## 2025-01-02 ENCOUNTER — OFFICE VISIT (OUTPATIENT)
Dept: FAMILY MEDICINE | Facility: CLINIC | Age: 65
End: 2025-01-02
Payer: COMMERCIAL

## 2025-01-02 VITALS
BODY MASS INDEX: 26.7 KG/M2 | HEIGHT: 60 IN | OXYGEN SATURATION: 97 % | SYSTOLIC BLOOD PRESSURE: 174 MMHG | RESPIRATION RATE: 16 BRPM | TEMPERATURE: 97.3 F | WEIGHT: 136 LBS | HEART RATE: 56 BPM | DIASTOLIC BLOOD PRESSURE: 93 MMHG

## 2025-01-02 DIAGNOSIS — K64.4 EXTERNAL HEMORRHOIDS: ICD-10-CM

## 2025-01-02 DIAGNOSIS — E11.9 TYPE 2 DIABETES MELLITUS WITHOUT COMPLICATION, WITHOUT LONG-TERM CURRENT USE OF INSULIN (H): ICD-10-CM

## 2025-01-02 DIAGNOSIS — E78.2 MIXED HYPERLIPIDEMIA: ICD-10-CM

## 2025-01-02 DIAGNOSIS — Z23 NEEDS FLU SHOT: ICD-10-CM

## 2025-01-02 DIAGNOSIS — Z23 NEED FOR COVID-19 VACCINE: ICD-10-CM

## 2025-01-02 DIAGNOSIS — Z91.148 NON COMPLIANCE W MEDICATION REGIMEN: ICD-10-CM

## 2025-01-02 DIAGNOSIS — I10 PRIMARY HYPERTENSION: ICD-10-CM

## 2025-01-02 DIAGNOSIS — K21.00 GASTROESOPHAGEAL REFLUX DISEASE WITH ESOPHAGITIS, UNSPECIFIED WHETHER HEMORRHAGE: ICD-10-CM

## 2025-01-02 DIAGNOSIS — R10.31 RLQ ABDOMINAL PAIN: ICD-10-CM

## 2025-01-02 DIAGNOSIS — Z23 NEED FOR SHINGLES VACCINE: ICD-10-CM

## 2025-01-02 DIAGNOSIS — Z23 NEED FOR DIPHTHERIA-TETANUS-PERTUSSIS (TDAP) VACCINE: ICD-10-CM

## 2025-01-02 DIAGNOSIS — Z00.00 ROUTINE GENERAL MEDICAL EXAMINATION AT A HEALTH CARE FACILITY: Primary | ICD-10-CM

## 2025-01-02 DIAGNOSIS — R74.8 ELEVATED LIVER ENZYMES: ICD-10-CM

## 2025-01-02 LAB
ALBUMIN SERPL BCG-MCNC: 4.4 G/DL (ref 3.5–5.2)
ALP SERPL-CCNC: 113 U/L (ref 40–150)
ALT SERPL W P-5'-P-CCNC: 107 U/L (ref 0–70)
ANION GAP SERPL CALCULATED.3IONS-SCNC: 10 MMOL/L (ref 7–15)
AST SERPL W P-5'-P-CCNC: 69 U/L (ref 0–45)
BILIRUB SERPL-MCNC: 0.4 MG/DL
BUN SERPL-MCNC: 11.3 MG/DL (ref 8–23)
CALCIUM SERPL-MCNC: 9.4 MG/DL (ref 8.8–10.4)
CHLORIDE SERPL-SCNC: 102 MMOL/L (ref 98–107)
CHOLEST SERPL-MCNC: 326 MG/DL
CREAT SERPL-MCNC: 1.25 MG/DL (ref 0.67–1.17)
EGFRCR SERPLBLD CKD-EPI 2021: 64 ML/MIN/1.73M2
EST. AVERAGE GLUCOSE BLD GHB EST-MCNC: 192 MG/DL
FASTING STATUS PATIENT QL REPORTED: ABNORMAL
FASTING STATUS PATIENT QL REPORTED: ABNORMAL
GLUCOSE SERPL-MCNC: 244 MG/DL (ref 70–99)
HBA1C MFR BLD: 8.3 % (ref 0–5.6)
HCO3 SERPL-SCNC: 26 MMOL/L (ref 22–29)
HDLC SERPL-MCNC: 31 MG/DL
LDLC SERPL CALC-MCNC: ABNORMAL MG/DL
NONHDLC SERPL-MCNC: 295 MG/DL
POTASSIUM SERPL-SCNC: 4.3 MMOL/L (ref 3.4–5.3)
PROT SERPL-MCNC: 7.7 G/DL (ref 6.4–8.3)
SODIUM SERPL-SCNC: 138 MMOL/L (ref 135–145)
TRIGL SERPL-MCNC: 452 MG/DL

## 2025-01-02 RX ORDER — POLYETHYLENE GLYCOL 3350 17 G/17G
1 POWDER, FOR SOLUTION ORAL DAILY PRN
Qty: 510 G | Refills: 3 | Status: SHIPPED | OUTPATIENT
Start: 2025-01-02

## 2025-01-02 RX ORDER — LISINOPRIL 10 MG/1
10 TABLET ORAL DAILY
Qty: 90 TABLET | Refills: 3 | Status: SHIPPED | OUTPATIENT
Start: 2025-01-02 | End: 2025-01-02

## 2025-01-02 RX ORDER — HYDROCORTISONE ACETATE PRAMOXINE HCL 1; 1 G/100G; G/100G
CREAM TOPICAL 2 TIMES DAILY
Qty: 30 G | Refills: 0 | Status: SHIPPED | OUTPATIENT
Start: 2025-01-02

## 2025-01-02 RX ORDER — LISINOPRIL 10 MG/1
10 TABLET ORAL DAILY
Qty: 90 TABLET | Refills: 3 | Status: SHIPPED | OUTPATIENT
Start: 2025-01-02

## 2025-01-02 RX ORDER — OMEPRAZOLE 40 MG/1
40 CAPSULE, DELAYED RELEASE ORAL DAILY
Qty: 90 CAPSULE | Refills: 3 | Status: SHIPPED | OUTPATIENT
Start: 2025-01-02

## 2025-01-02 RX ORDER — LANCETS
EACH MISCELLANEOUS
Qty: 100 EACH | Refills: 6 | Status: SHIPPED | OUTPATIENT
Start: 2025-01-02

## 2025-01-02 RX ORDER — ATORVASTATIN CALCIUM 40 MG/1
40 TABLET, FILM COATED ORAL DAILY
Qty: 90 TABLET | Refills: 3 | Status: SHIPPED | OUTPATIENT
Start: 2025-01-02

## 2025-01-02 RX ORDER — ASPIRIN 81 MG/1
81 TABLET ORAL DAILY
Qty: 90 TABLET | Refills: 3 | Status: SHIPPED | OUTPATIENT
Start: 2025-01-02

## 2025-01-02 SDOH — HEALTH STABILITY: PHYSICAL HEALTH: ON AVERAGE, HOW MANY MINUTES DO YOU ENGAGE IN EXERCISE AT THIS LEVEL?: 0 MIN

## 2025-01-02 SDOH — HEALTH STABILITY: PHYSICAL HEALTH: ON AVERAGE, HOW MANY DAYS PER WEEK DO YOU ENGAGE IN MODERATE TO STRENUOUS EXERCISE (LIKE A BRISK WALK)?: 0 DAYS

## 2025-01-02 ASSESSMENT — SOCIAL DETERMINANTS OF HEALTH (SDOH): HOW OFTEN DO YOU GET TOGETHER WITH FRIENDS OR RELATIVES?: TWICE A WEEK

## 2025-01-02 NOTE — PATIENT INSTRUCTIONS
Patient Education     Check with your pharmacy regarding getting the Shingles vaccine and Tdap, as insurance may not cover these in clinic.    You have been provided the Preventive Care Advice document.    Additional copies can be found here: [email or web address]  Preventive Care Advice   This is general advice given by our system to help you stay healthy. However, your care team may have specific advice just for you. Please talk to your care team about your preventive care needs.  Nutrition  Eat 5 or more servings of fruits and vegetables each day.  Try wheat bread, brown rice and whole grain pasta (instead of white bread, rice, and pasta).  Get enough calcium and vitamin D. Check the label on foods and aim for 100% of the RDA (recommended daily allowance).  Lifestyle  Exercise at least 150 minutes each week  (30 minutes a day, 5 days a week).  Do muscle strengthening activities 2 days a week. These help control your weight and prevent disease.  No smoking.  Wear sunscreen to prevent skin cancer.  Have a dental exam and cleaning every 6 months.  Yearly exams  See your health care team every year to talk about:  Any changes in your health.  Any medicines your care team has prescribed.  Preventive care, family planning, and ways to prevent chronic diseases.  Shots (vaccines)   HPV shots (up to age 26), if you've never had them before.  Hepatitis B shots (up to age 59), if you've never had them before.  COVID-19 shot: Get this shot when it's due.  Flu shot: Get a flu shot every year.  Tetanus shot: Get a tetanus shot every 10 years.  Pneumococcal, hepatitis A, and RSV shots: Ask your care team if you need these based on your risk.  Shingles shot (for age 50 and up)  General health tests  Diabetes screening:  Starting at age 35, Get screened for diabetes at least every 3 years.  If you are younger than age 35, ask your care team if you should be screened for diabetes.  Cholesterol test: At age 39, start having  a cholesterol test every 5 years, or more often if advised.  Bone density scan (DEXA): At age 50, ask your care team if you should have this scan for osteoporosis (brittle bones).  Hepatitis C: Get tested at least once in your life.  STIs (sexually transmitted infections)  Before age 24: Ask your care team if you should be screened for STIs.  After age 24: Get screened for STIs if you're at risk. You are at risk for STIs (including HIV) if:  You are sexually active with more than one person.  You don't use condoms every time.  You or a partner was diagnosed with a sexually transmitted infection.  If you are at risk for HIV, ask about PrEP medicine to prevent HIV.  Get tested for HIV at least once in your life, whether you are at risk for HIV or not.  Cancer screening tests  Cervical cancer screening: If you have a cervix, begin getting regular cervical cancer screening tests starting at age 21.  Breast cancer scan (mammogram): If you've ever had breasts, begin having regular mammograms starting at age 40. This is a scan to check for breast cancer.  Colon cancer screening: It is important to start screening for colon cancer at age 45.  Have a colonoscopy test every 10 years (or more often if you're at risk) Or, ask your provider about stool tests like a FIT test every year or Cologuard test every 3 years.  To learn more about your testing options, visit:   .  For help making a decision, visit:   https://bit.ly/rg61905.  Prostate cancer screening test: If you have a prostate, ask your care team if a prostate cancer screening test (PSA) at age 55 is right for you.  Lung cancer screening: If you are a current or former smoker ages 50 to 80, ask your care team if ongoing lung cancer screenings are right for you.  For informational purposes only. Not to replace the advice of your health care provider. Copyright   2023 [employer]. All rights reserved. Clinically reviewed by the [facility] Transitions  Program. Voxy 814065 - REV 01/24.

## 2025-01-02 NOTE — PROGRESS NOTES
"Preventive Care Visit  [facility]  Sheila Pérez MD, Family Medicine  Jan 2, 2025      Carmelo was seen today for physical.    Diagnoses and all orders for this visit:    Routine general medical examination at a health care facility  -     REVIEW OF HEALTH MAINTENANCE PROTOCOL ORDERS  -     PRIMARY CARE FOLLOW-UP SCHEDULING; Future    Non compliance w medication regimen  Comments:  Only has full bottle of lisinopril and empty bottle of metformin. Reports only taking Vit B12 prn and \"janumet\" as needed. Refer to MTM. See management below.  Orders:  -     Med Therapy Management Referral    Primary hypertension  Comments:  chronic, uncontrolled. On Lisinopril 10 mg daily, non-compliant, forgets.  Discussed long-term risks with uncontrolled hypertension and importance of medication compliance.  Patient was agreeable.  Refilled, refer to MTM. RTC 1 month.  Orders:  -     lisinopril (ZESTRIL) 10 MG tablet; Take 1 tablet (10 mg) by mouth daily.    Type 2 diabetes mellitus without complication, without long-term current use of insulin (H)  Comments:  chronic, above goal at last check.  Current management: Jardiance and metformin, however patient has not been taking.  Discussed complications of uncontrolled diabetes, and importance of medication compliance.  Patient was agreeable to  Orders:  -     HEMOGLOBIN A1C; Future  -     OPTOMETRY REFERRAL; Future  -     blood glucose monitoring (NO BRAND SPECIFIED) meter device kit; Use to test blood sugar 2 times daily or as directed. Preferred blood glucose meter OR supplies to accompany: Blood Glucose Monitor Brands: per insurance.  -     blood glucose (NO BRAND SPECIFIED) test strip; Use to test blood sugar 2 times daily or as directed. To accompany: Blood Glucose Monitor Brands: per insurance.  -     thin (NO BRAND SPECIFIED) lancets; Use with lanceting device. To accompany: Blood Glucose Monitor Brands: per insurance.  -     empagliflozin (JARDIANCE) 10 MG " TABS tablet; Take 1 tablet (10 mg) by mouth daily.  -     metFORMIN (GLUCOPHAGE) 500 MG tablet; 2 tabs po 2 times daily.  -     HEMOGLOBIN A1C    RLQ abdominal pain  Comments:  Mild TTP of RLQ.  No N/V, anorexia, fever.  Psoas sign negative.  Low suspicion for appendicitis.  Will check US.  Go to ED if worsen.  Orders:  -     US Appendix Only; Future    Gastroesophageal reflux disease with esophagitis, unspecified whether hemorrhage  Comments:  Chronic, has not been taking PPI.  Refilled.  Orders:  -     omeprazole (PRILOSEC) 40 MG DR capsule; Take 1 capsule (40 mg) by mouth daily.    Mixed hyperlipidemia  Comments:   5/13/2024.  On atorvastatin 40 mg daily, noncompliant.  Discussed ASCVD risk 56%, importance of medication compliance.  Patient is agreeable.  Orders:  -     Lipid Profile (Chol, Trig, HDL, LDL calc); Future  -     aspirin 81 MG EC tablet; Take 1 tablet (81 mg) by mouth daily.  -     atorvastatin (LIPITOR) 40 MG tablet; Take 1 tablet (40 mg) by mouth daily.  -     Lipid Profile (Chol, Trig, HDL, LDL calc)    Elevated liver enzymes  Comments:  chronic. Immune Hep B and ferritin WNL 7/14/23. Hep C neg 2/17/23. Fatty liver on US 7/27/23. Likely 2/2 fatty liver.  Orders:  -     Comprehensive metabolic panel (BMP + Alb, Alk Phos, ALT, AST, Total. Bili, TP); Future  -     Comprehensive metabolic panel (BMP + Alb, Alk Phos, ALT, AST, Total. Bili, TP)    External hemorrhoids  Comments:  Hydrocortisone cream, discussed preventing constipation, MiraLAX as needed.  Schedule colonoscopy.  Orders:  -     hydrocortisone-pramoxine (ANALPRAM-HC) rectal cream; Place rectally 2 times daily.  -     polyethylene glycol (MIRALAX) 17 GM/Dose powder; Take 17 g (1 Capful) by mouth daily as needed for constipation.    Need for COVID-19 vaccine  -     COVID-19 12+ (PFIZER)    Need for diphtheria-tetanus-pertussis (Tdap) vaccine  Need for shingles vaccine  Comments:  Discussed, unsure about insurance coverage.  Consider  "getting at pharmacy.    Needs flu shot  -     INFLUENZA HIGH DOSE, TRIVALENT, PF (FLUZONE)      Counseling  Appropriate preventative services were discussed with patient, including applicable screen as appropriate for fall prevention, nutrition, physical activity, tobacco use cessation, weight loss and connection.  Check list reviewing preventative services available has been given to patient.      50 minutes spent precharting, face-to-face, and documentation regarding chronic and acute issues as noted above in addition to 15 minutes spent on preventative visit on date of service.      Chemo Rhodes is a 64 year old, presenting for the following:  Physical           HPI    Hemorrhoids  Had them before  Feels tenderness when he sits  Pelvic/groin discomfort  Reports he used to use dermovate/clobatesol    Glucose meter not working    HTN  BP Readings from Last 3 Encounters:   01/02/25 (!) 174/93   05/13/24 (!) 180/92   07/03/23 (!) 160/90   Has forgotten to take it for 2 months now    Medication non compliance  Only has full bottle of lisinopril and empty bottle of metformin. Reports only taking Vit B12 prn and \"janumet\" as needed.           Health Care Directive  Patient does not have a Health Care Directive: Discussed advance care planning with patient; however, patient declined at this time.        7/3/2023   Nutrition   Diet: regular (no restrictions)         7/3/2023   Exercise   Frequency of exercise: None           7/3/2023   Dental   Dentist two times every year? No             7/3/2023   Substance Use   Alcohol more than 3/day or more than 7/wk No     Social History     Tobacco Use    Smoking status: Never    Smokeless tobacco: Never       Last PSA: No results found for: \"PSA\"  ASCVD Risk   The 10-year ASCVD risk score (Leah ORLANDO, et al., 2019) is: 57.7%    Values used to calculate the score:      Age: 64 years      Sex: Male      Is Non- : No      Diabetic: Yes      " Tobacco smoker: No      Systolic Blood Pressure: 174 mmHg      Is BP treated: Yes      HDL Cholesterol: 37 mg/dL      Total Cholesterol: 298 mg/dL    Recent Labs   Lab Test 05/13/24  1023 02/17/23  1341   CHOL 298* 317*   HDL 37* 29*   *  --    TRIG 380* 499*     Lab Results   Component Value Date    A1C 8.2 05/13/2024    A1C 8.3 07/14/2023    A1C 7.3 02/17/2023     Lab Results   Component Value Date    HGB 13.9 05/13/2024    WBC 6.2 05/13/2024    MCV 81 05/13/2024    CR 1.26 (H) 05/13/2024    AST 58 (H) 05/13/2024    ALT 90 (H) 05/13/2024    GFRESTIMATED 64 05/13/2024    HCVAB Nonreactive 02/17/2023    HIAGAB Nonreactive 02/17/2023    A1C 8.3 (H) 01/02/2025    A1C 8.2 (H) 05/13/2024    A1C 8.3 (H) 07/14/2023       Reviewed and updated as needed this visit by Provider                    Current providers sharing in care for this patient include:  Patient Care Team:  Sheila Pérez MD as PCP - General (Family Medicine)  Sheila Pérez MD as Assigned PCP  Alley Cali RD as Registered Dietitian (Dietitian, Registered)  Gayle Chavez, RN as Lead Care Coordinator (Primary Care - CC)    The following health maintenance items are reviewed in Epic and correct as of today:  Health Maintenance   Topic Date Due    DTAP/TDAP/TD IMMUNIZATION (1 - Tdap) Never done    ZOSTER IMMUNIZATION (1 of 2) Never done    COLORECTAL CANCER SCREENING  02/01/2022    EYE EXAM  08/19/2024    A1C  04/02/2025    BMP  05/13/2025    LIPID  05/13/2025    MICROALBUMIN  05/13/2025    DIABETIC FOOT EXAM  05/13/2025    MEDICARE ANNUAL WELLNESS VISIT  01/02/2026    ANNUAL REVIEW OF HM ORDERS  01/02/2026    FALL RISK ASSESSMENT  01/02/2026    ADVANCE CARE PLANNING  01/02/2030    HEPATITIS C SCREENING  Completed    HIV SCREENING  Completed    PHQ-2 (once per calendar year)  Completed    INFLUENZA VACCINE  Completed    Pneumococcal Vaccine: 50+ Years  Completed    RSV VACCINE  Completed    COVID-19 Vaccine  Completed    HPV IMMUNIZATION  Aged Out     "MENINGITIS IMMUNIZATION  Aged Out    RSV MONOCLONAL ANTIBODY  Aged Out          Objective    Exam  BP (!) 174/93   Pulse 56   Temp 97.3  F (36.3  C) (Temporal)   Resp 16   Ht 1.469 m (4' 9.84\")   Wt 61.7 kg (136 lb)   SpO2 97%   BMI 28.59 kg/m     Estimated body mass index is 28.59 kg/m  as calculated from the following:    Height as of this encounter: 1.469 m (4' 9.84\").    Weight as of this encounter: 61.7 kg (136 lb).    Physical Exam  General Appearance:  Alert, cooperative, no distress, appears stated age.  Head:  Normocephalic, without obvious abnormality, atraumatic.  Eyes:  Conjunctivae/corneas clear, extraocular movements intact both eyes.  Lungs:  Clear to auscultation bilaterally, respirations unlabored.  Heart:  Regular rate and rhythm, S1 and S2 normal, no murmur, rub or gallop.  Abdomen:  Soft, mild TTP to RLQ, bowel sounds active all four quadrants.No CVA tenderness.   Extremities:  Atraumatic, no cyanosis or edema.  Skin:  Skin color, texture, turgor normal, no rashes or lesions.  Neurologic: No focal deficits.  : right anterior external hemorrhoid, non-thrombosed. No palpable internal hemorrhoids.            1/2/2025   Mini Cog   Clock Draw Score 2 Normal   3 Item Recall 2 objects recalled   Mini Cog Total Score 4             Signed Electronically by: Sheila Pérez MD    Prior to immunization administration, verified patients identity using patient s name and date of birth. Please see Immunization Activity for additional information.     Screening Questionnaire for Adult Immunization    Are you sick today?   No   Do you have allergies to medications, food, a vaccine component or latex?   No   Have you ever had a serious reaction after receiving a vaccination?   No   Do you have a long-term health problem with heart, lung, kidney, or metabolic disease (e.g., diabetes), asthma, a blood disorder, no spleen, complement component deficiency, a cochlear implant, or a spinal fluid leak?  Are you on " long-term aspirin therapy?   Yes   Do you have cancer, leukemia, HIV/AIDS, or any other immune system problem?   No   Do you have a parent, brother, or sister with an immune system problem?   No   In the past 3 months, have you taken medications that affect  your immune system, such as prednisone, other steroids, or anticancer drugs; drugs for the treatment of rheumatoid arthritis, Crohn s disease, or psoriasis; or have you had radiation treatments?   No   Have you had a seizure, or a brain or other nervous system problem?   No   During the past year, have you received a transfusion of blood or blood    products, or been given immune (gamma) globulin or antiviral drug?   No   For women: Are you pregnant or is there a chance you could become       pregnant during the next month?   No   Have you received any vaccinations in the past 4 weeks?   No     Immunization questionnaire was positive for at least one answer.  Notified provider.      Patient instructed to remain in clinic for 15 minutes afterwards, and to report any adverse reactions.     Screening performed by Franki Marin MA on 1/2/2025 at 10:08 AM.

## 2025-01-06 ENCOUNTER — TELEPHONE (OUTPATIENT)
Dept: FAMILY MEDICINE | Facility: CLINIC | Age: 65
End: 2025-01-06

## 2025-01-06 NOTE — TELEPHONE ENCOUNTER
----- Message from Sheila Beto sent at 1/6/2025  4:49 PM CST -----  Triglycerides, the fats in your blood, were too high they were not able to measure the LDL, the bad cholesterol.  I recommend that we repeat your lipid panel fasting.  Are you taking your cholesterol medication daily?  If you are not, please start taking it daily.  If you already are, we will need to increase the dose.    Kidney function is stable.  Liver enzymes are still elevated meaning there is sign of liver inflammation, likely from fatty liver.  Over time, this can lead to cirrhosis/liver failure.  It is important that you make Lifestyle modifications, including: Increasing intake of vegetables and fruits, decreasing intake of processed foods/carbohydrates/sugars, having regular physical activity, and losing weight.    Diabetes is stable.

## 2025-01-15 NOTE — TELEPHONE ENCOUNTER
Attempt #1. No answer. Unable to LVM. Please relayed Dr. Pérez's message below upon return call.      Desmond BRUNER RN  [facility]

## 2025-01-17 NOTE — TELEPHONE ENCOUNTER
RN called with a Maki . Relayed message below from Dr. Pérez. Pt verbalized understanding and in agreement with plan. Pt reported he is not taking atorvastatin 40 mg regularly. Pt will make an effort to take it every day. RN also reviewed medication directions with patient.      Routed to Dr. Pérez for review.    Desmond BRUNER RN  [facility]

## 2025-02-04 ENCOUNTER — VIRTUAL VISIT (OUTPATIENT)
Dept: INTERPRETER SERVICES | Facility: CLINIC | Age: 65
End: 2025-02-04

## 2025-02-04 ENCOUNTER — OFFICE VISIT (OUTPATIENT)
Dept: FAMILY MEDICINE | Facility: CLINIC | Age: 65
End: 2025-02-04
Payer: COMMERCIAL

## 2025-02-04 VITALS
WEIGHT: 137 LBS | HEART RATE: 65 BPM | RESPIRATION RATE: 16 BRPM | OXYGEN SATURATION: 100 % | SYSTOLIC BLOOD PRESSURE: 139 MMHG | HEIGHT: 60 IN | BODY MASS INDEX: 26.9 KG/M2 | DIASTOLIC BLOOD PRESSURE: 86 MMHG | TEMPERATURE: 98.4 F

## 2025-02-04 DIAGNOSIS — M54.2 NECK PAIN: ICD-10-CM

## 2025-02-04 DIAGNOSIS — Z23 NEED FOR SHINGLES VACCINE: ICD-10-CM

## 2025-02-04 DIAGNOSIS — R10.31 RLQ ABDOMINAL PAIN: ICD-10-CM

## 2025-02-04 DIAGNOSIS — I10 PRIMARY HYPERTENSION: Primary | ICD-10-CM

## 2025-02-04 DIAGNOSIS — D12.6 TUBULOVILLOUS ADENOMA POLYP OF COLON: ICD-10-CM

## 2025-02-04 DIAGNOSIS — Z87.11 HISTORY OF GASTRIC ULCER: ICD-10-CM

## 2025-02-04 DIAGNOSIS — Z23 NEED FOR DIPHTHERIA-TETANUS-PERTUSSIS (TDAP) VACCINE: ICD-10-CM

## 2025-02-04 DIAGNOSIS — E78.2 MIXED HYPERLIPIDEMIA: ICD-10-CM

## 2025-02-04 DIAGNOSIS — E11.9 TYPE 2 DIABETES MELLITUS WITHOUT COMPLICATION, WITHOUT LONG-TERM CURRENT USE OF INSULIN (H): ICD-10-CM

## 2025-02-04 DIAGNOSIS — K59.00 CONSTIPATION, UNSPECIFIED CONSTIPATION TYPE: ICD-10-CM

## 2025-02-04 DIAGNOSIS — Z87.898 HISTORY OF ALCOHOL USE: ICD-10-CM

## 2025-02-04 PROBLEM — R80.9 URINE TEST POSITIVE FOR MICROALBUMINURIA: Status: RESOLVED | Noted: 2023-07-06 | Resolved: 2025-02-04

## 2025-02-04 PROCEDURE — 90715 TDAP VACCINE 7 YRS/> IM: CPT | Performed by: STUDENT IN AN ORGANIZED HEALTH CARE EDUCATION/TRAINING PROGRAM

## 2025-02-04 PROCEDURE — G2211 COMPLEX E/M VISIT ADD ON: HCPCS | Performed by: STUDENT IN AN ORGANIZED HEALTH CARE EDUCATION/TRAINING PROGRAM

## 2025-02-04 PROCEDURE — 90471 IMMUNIZATION ADMIN: CPT | Performed by: STUDENT IN AN ORGANIZED HEALTH CARE EDUCATION/TRAINING PROGRAM

## 2025-02-04 PROCEDURE — 99214 OFFICE O/P EST MOD 30 MIN: CPT | Mod: 25 | Performed by: STUDENT IN AN ORGANIZED HEALTH CARE EDUCATION/TRAINING PROGRAM

## 2025-02-04 PROCEDURE — T1013 SIGN LANG/ORAL INTERPRETER: HCPCS | Mod: U4

## 2025-02-04 PROCEDURE — 90472 IMMUNIZATION ADMIN EACH ADD: CPT | Performed by: STUDENT IN AN ORGANIZED HEALTH CARE EDUCATION/TRAINING PROGRAM

## 2025-02-04 PROCEDURE — 90750 HZV VACC RECOMBINANT IM: CPT | Performed by: STUDENT IN AN ORGANIZED HEALTH CARE EDUCATION/TRAINING PROGRAM

## 2025-02-04 NOTE — PROGRESS NOTES
Carmelo was seen today for hypertension and diabetes.    Diagnoses and all orders for this visit:    Primary hypertension  Comments:  Chronic, reports complinace with Lisinopril 10 mg daily, now controlled again.    Type 2 diabetes mellitus without complication, without long-term current use of insulin (H)  Comments:  Chronic, slight above goal at last viist, however, was not compliant with meds. Restarted. Plan to recheck in 2 months.    RLQ abdominal pain  Comments:  Appendix US ordered at last visit, but said he didn't get a call to schedule, will have TC help schedule.  Orders:  -     Adult GI  Referral - Procedure Only; Future    Mixed hyperlipidemia  Comments:  On atorvastatin 40 mg daily.  LDL not able to be measured 2/2 high TG, recommend repeating fasting.  Not taking statin, Will start. Recheck 4/2025    Tubulovillous adenoma polyp of colon  Comments:  Discussed that pt needs colonoscopy. He was agreeable.  Orders:  -     Adult GI  Referral - Procedure Only; Future    History of gastric ulcer  Comments:  H/o alcohol use as noted below. Discussed repeat EGD, pt was agreeable.  Orders:  -     Adult GI  Referral - Procedure Only; Future    History of alcohol use  Comments:  Noted per chart review of external notes.  Orders:  -     Adult GI  Referral - Procedure Only; Future    Constipation, unspecified constipation type  Comments:  Improved with Miralax. continue.    Need for shingles vaccine  -     ZOSTER RECOMBINANT ADJUVANTED (SHINGRIX)    Need for diphtheria-tetanus-pertussis (Tdap) vaccine  -     TDAP 10-64Y (ADACEL,BOOSTRIX)    Neck pain  Comments:  Pain and TTP at L SCM muscle, likely strain. Monitor.      The longitudinal plan of care for the diagnosis(es)/condition(s) as documented were addressed during this visit. Due to the added complexity in care, I will continue to support Carmelo in the subsequent management and with ongoing continuity of care.      Subjective    Carmelo is a 64 year old, presenting for the following health issues:  Hypertension and Diabetes (Would like FIT test )        2/4/2025     8:52 AM   Additional Questions   Roomed by aaron   Accompanied by self     History of Present Illness       Diabetes:   He presents for follow up of diabetes.    He is not checking blood glucose.         He has no concerns regarding his diabetes at this time.   He is not experiencing numbness or burning in feet, excessive thirst, blurry vision, weight changes or redness, sores or blisters on feet. The patient has not had a diabetic eye exam in the last 12 months.          Hypertension: He presents for follow up of hypertension.  He does check blood pressure  regularly outside of the clinic. Outside blood pressures have been over 140/90. He does not follow a low salt diet.       Left anterior neck pain  2 days  Not sure what causes pain  Thinks he may have slept in a weird position, woke up with pain.     DM2  Lab Results   Component Value Date    A1C 8.3 01/02/2025    A1C 8.2 05/13/2024    A1C 8.3 07/14/2023    A1C 7.3 02/17/2023   Jardiance 10 mg daily, metformin 1 g twice daily    HTN  BP Readings from Last 3 Encounters:   02/04/25 139/86   01/02/25 (!) 174/93   05/13/24 (!) 180/92     Lisinopril 10 mg daily    HLD  Recent Labs   Lab Test 01/02/25  1130 05/13/24  1023 02/17/23  1341   CHOL 326* 298* 317*   HDL 31* 37* 29*   LDL  --  185*  --    TRIG 452* 380* 499*     LDL Cholesterol Calculated   Date Value Ref Range Status   01/02/2025   Final     Comment:     Cannot estimate LDL when triglyceride exceeds 400 mg/dL   05/13/2024 185 (H) <=100 mg/dL Final     LDL not able to be measured 2/2 high TG, recommend repeating fasting.  Not taking statin, Will start. Recheck 4/2025    Lab Results   Component Value Date    HGB 13.9 05/13/2024    WBC 6.2 05/13/2024    MCV 81 05/13/2024    CR 1.25 (H) 01/02/2025    AST 69 (H) 01/02/2025     (H) 01/02/2025    GFRESTIMATED 64 01/02/2025  "   HCVAB Nonreactive 02/17/2023    HIAGAB Nonreactive 02/17/2023    A1C 8.3 (H) 01/02/2025    A1C 8.2 (H) 05/13/2024    A1C 8.3 (H) 07/14/2023     EGD and colonoscopy 2/1/29        Objective    /86 (BP Location: Left arm, Patient Position: Sitting, Cuff Size: Adult Regular)   Pulse 65   Temp 98.4  F (36.9  C) (Temporal)   Resp 16   Ht 1.448 m (4' 9\")   Wt 62.1 kg (137 lb)   SpO2 100%   BMI 29.65 kg/m    Body mass index is 29.65 kg/m .  Physical Exam   General: Well developed, well nourished.  Skin:  Dry without rash.    Head:  Normocephalic-atraumatic.    Neck: TTP at L SCM muscle, no lymphadenopathy.  Eye:  Normal conjunctivae.     Respiratory:  Normal respiratory effort.   Gastrointestinal:  Non-distended.    Musculoskeletal:  No deformity or edema.  Neurologic: No focal deficits.            Signed Electronically by: Sheila Pérez MD    "

## 2025-02-04 NOTE — PROGRESS NOTES
Screening Questionnaire for Adult Immunization    Are you sick today?   No   Do you have allergies to medications, food, a vaccine component or latex?   No   Have you ever had a serious reaction after receiving a vaccination?   No   Do you have a long-term health problem with heart, lung, kidney, or metabolic disease (e.g., diabetes), asthma, a blood disorder, no spleen, complement component deficiency, a cochlear implant, or a spinal fluid leak?  Are you on long-term aspirin therapy?   Yes   Do you have cancer, leukemia, HIV/AIDS, or any other immune system problem?   No   Do you have a parent, brother, or sister with an immune system problem?   No   In the past 3 months, have you taken medications that affect  your immune system, such as prednisone, other steroids, or anticancer drugs; drugs for the treatment of rheumatoid arthritis, Crohn s disease, or psoriasis; or have you had radiation treatments?   No   Have you had a seizure, or a brain or other nervous system problem?   No   During the past year, have you received a transfusion of blood or blood    products, or been given immune (gamma) globulin or antiviral drug?   No   For women: Are you pregnant or is there a chance you could become       pregnant during the next month?   No   Have you received any vaccinations in the past 4 weeks?   No     Immunization questionnaire was positive for at least one answer.  Notified .      Patient instructed to remain in clinic for 15 minutes afterwards, and to report any adverse reactions.     Screening performed by Tomas Bergman MA on 2/4/2025 at 8:59 AM.    Answers submitted by the patient for this visit:  Hypertension Visit (Submitted on 2/4/2025)  Chief Complaint: Chronic problems general questions HPI Form  Do you check your blood pressure regularly outside of the clinic?: Yes  Are your blood pressures ever more than 140 on the top number (systolic) OR more than 90 on the bottom number (diastolic)? (For example, greater  than 140/90): Yes  Are you following a low salt diet?: No  Diabetes Visit (Submitted on 2/4/2025)  Chief Complaint: Chronic problems general questions HPI Form  Frequency of checking blood sugars:: not at all  Diabetic concerns:: none  Paraesthesia present:: none of these symptoms  Have you had a diabetic eye exam within the last year?: No  General Questionnaire (Submitted on 2/4/2025)  Chief Complaint: Chronic problems general questions HPI Form  Questionnaire about: Chronic problems general questions HPI Form (Submitted on 2/4/2025)  Chief Complaint: Chronic problems general questions HPI Form

## 2025-02-04 NOTE — PROGRESS NOTES
DM2  Lab Results   Component Value Date    A1C 8.3 01/02/2025    A1C 8.2 05/13/2024    A1C 8.3 07/14/2023    A1C 7.3 02/17/2023   Jardiance 10 mg daily, metformin 1 g twice daily    HTN  Lisinopril 10 mg daily    HLD  Recent Labs   Lab Test 01/02/25  1130 05/13/24  1023 02/17/23  1341   CHOL 326* 298* 317*   HDL 31* 37* 29*   LDL  --  185*  --    TRIG 452* 380* 499*     LDL Cholesterol Calculated   Date Value Ref Range Status   01/02/2025   Final     Comment:     Cannot estimate LDL when triglyceride exceeds 400 mg/dL   05/13/2024 185 (H) <=100 mg/dL Final     LDL not able to be measured 2/2 high TG, recommend repeating fasting.  Not taking statin, Will start. Recheck 4/2025    Lab Results   Component Value Date    HGB 13.9 05/13/2024    WBC 6.2 05/13/2024    MCV 81 05/13/2024    CR 1.25 (H) 01/02/2025    AST 69 (H) 01/02/2025     (H) 01/02/2025    GFRESTIMATED 64 01/02/2025    HCVAB Nonreactive 02/17/2023    HIAGAB Nonreactive 02/17/2023    A1C 8.3 (H) 01/02/2025    A1C 8.2 (H) 05/13/2024    A1C 8.3 (H) 07/14/2023     EGD and colonoscopy 2/1/29

## 2025-05-21 ENCOUNTER — PATIENT OUTREACH (OUTPATIENT)
Dept: GERIATRIC MEDICINE | Facility: CLINIC | Age: 65
End: 2025-05-21
Payer: COMMERCIAL

## 2025-05-21 NOTE — PROGRESS NOTES
[facility] Care Coordination Contact      [facility] Refusal Telephone Assessment    Member refused home visit [facility] on 5/21/2025 (reason: MA termed).    Member reports he never received renewal documents from the [facility]. Home address confirmed and current.   Suggested follow up with [facility] office at the [facility]. [facility] phone number provided to daughter Kristian Powell.    ER visits: No  Hospitalizations: No  Health concerns: No  Falls/Injuries: No  ADL/IADL Dependencies:        Member currently receiving the following home care services:     Member currently receiving the following community resources:    Informal support(s):      Follow-Up Plan: Member informed of future contact, plan to f/u with member when MA active again and will offer a home visit.  Contact information shared with member and daughter, encouraged member to call with any questions or concerns at any time.    This CC note routed to PCP, Sheila Pérez.    Gayle Chavez RN, PHN   [facility]  [phone]

## 2025-06-30 ENCOUNTER — PATIENT OUTREACH (OUTPATIENT)
Dept: GASTROENTEROLOGY | Facility: CLINIC | Age: 65
End: 2025-06-30
Payer: COMMERCIAL

## 2025-07-22 ENCOUNTER — TELEPHONE (OUTPATIENT)
Dept: FAMILY MEDICINE | Facility: CLINIC | Age: 65
End: 2025-07-22
Payer: COMMERCIAL

## 2025-07-22 NOTE — TELEPHONE ENCOUNTER
Future Appointments 7/22/2025 - 1/18/2026        Date Visit Type Length Department Provider     9/9/2025  9:45 AM OFFICE VISIT 30 min [facility] Sheila Pérez MD    Location Instructions:     [facility] is located at [address] [place] [address] in [place],at the intersection of [facility].This is one block south of the [facility].Free parking is available in the lot directly north of the clinic across [facility].The clinic is near stops along bus [address].  Due to ongoing road construction in the area around the [facility], travel times to this location may be longer than usual.Please plan for extra travel time and check the [place] Department of Transportation website for delay, closure,and detour information on the various road construction projects in the area.

## 2025-08-07 ENCOUNTER — PATIENT OUTREACH (OUTPATIENT)
Dept: GERIATRIC MEDICINE | Facility: CLINIC | Age: 65
End: 2025-08-07
Payer: COMMERCIAL